# Patient Record
Sex: FEMALE | Race: WHITE | Employment: OTHER | URBAN - NONMETROPOLITAN AREA
[De-identification: names, ages, dates, MRNs, and addresses within clinical notes are randomized per-mention and may not be internally consistent; named-entity substitution may affect disease eponyms.]

---

## 2017-05-03 ENCOUNTER — TELEPHONE (OUTPATIENT)
Dept: FAMILY MEDICINE CLINIC | Age: 82
End: 2017-05-03

## 2017-07-18 ENCOUNTER — TELEPHONE (OUTPATIENT)
Dept: FAMILY MEDICINE CLINIC | Age: 82
End: 2017-07-18

## 2017-07-18 DIAGNOSIS — F41.9 ANXIETY: ICD-10-CM

## 2017-07-18 DIAGNOSIS — R63.4 WEIGHT LOSS: ICD-10-CM

## 2017-07-18 DIAGNOSIS — R53.1 GENERALIZED WEAKNESS: ICD-10-CM

## 2017-07-18 PROBLEM — F03.90 DEMENTIA (HCC): Status: ACTIVE | Noted: 2017-07-18

## 2017-07-18 PROBLEM — G47.00 INSOMNIA: Status: ACTIVE | Noted: 2017-07-18

## 2017-07-18 RX ORDER — KETOCONAZOLE 20 MG/ML
SHAMPOO TOPICAL
COMMUNITY

## 2017-07-18 RX ORDER — ACETAMINOPHEN 325 MG/1
650 TABLET ORAL 3 TIMES DAILY
COMMUNITY

## 2017-07-18 RX ORDER — ARIPIPRAZOLE 5 MG/1
5 TABLET ORAL DAILY
COMMUNITY
End: 2017-07-27 | Stop reason: DRUGHIGH

## 2017-07-18 RX ORDER — PHENOL 1.4 %
1 AEROSOL, SPRAY (ML) MUCOUS MEMBRANE NIGHTLY
COMMUNITY

## 2017-07-27 RX ORDER — ARIPIPRAZOLE 2 MG/1
2 TABLET ORAL 2 TIMES DAILY
COMMUNITY

## 2017-08-24 ENCOUNTER — OUTSIDE SERVICES (OUTPATIENT)
Dept: INTERNAL MEDICINE | Age: 82
End: 2017-08-24
Payer: MEDICARE

## 2017-08-24 DIAGNOSIS — R63.4 WEIGHT LOSS: ICD-10-CM

## 2017-08-24 DIAGNOSIS — F41.9 ANXIETY: ICD-10-CM

## 2017-08-24 DIAGNOSIS — G47.00 INSOMNIA, UNSPECIFIED TYPE: ICD-10-CM

## 2017-08-24 DIAGNOSIS — F03.90 DEMENTIA WITHOUT BEHAVIORAL DISTURBANCE, UNSPECIFIED DEMENTIA TYPE: Primary | ICD-10-CM

## 2017-08-24 DIAGNOSIS — R53.1 GENERALIZED WEAKNESS: ICD-10-CM

## 2017-08-24 PROCEDURE — 99308 SBSQ NF CARE LOW MDM 20: CPT | Performed by: NURSE PRACTITIONER

## 2017-08-29 DIAGNOSIS — R63.4 WEIGHT LOSS: ICD-10-CM

## 2017-09-26 ENCOUNTER — OUTSIDE SERVICES (OUTPATIENT)
Dept: FAMILY MEDICINE CLINIC | Age: 82
End: 2017-09-26
Payer: MEDICARE

## 2017-09-26 DIAGNOSIS — F02.80 LATE ONSET ALZHEIMER'S DISEASE WITHOUT BEHAVIORAL DISTURBANCE (HCC): Primary | ICD-10-CM

## 2017-09-26 DIAGNOSIS — G30.1 LATE ONSET ALZHEIMER'S DISEASE WITHOUT BEHAVIORAL DISTURBANCE (HCC): Primary | ICD-10-CM

## 2017-09-26 DIAGNOSIS — R53.1 GENERALIZED WEAKNESS: ICD-10-CM

## 2017-09-26 DIAGNOSIS — G47.00 INSOMNIA, UNSPECIFIED TYPE: ICD-10-CM

## 2017-09-26 DIAGNOSIS — F41.9 ANXIETY: ICD-10-CM

## 2017-09-26 DIAGNOSIS — R63.4 WEIGHT LOSS: ICD-10-CM

## 2017-09-26 PROCEDURE — 99308 SBSQ NF CARE LOW MDM 20: CPT | Performed by: FAMILY MEDICINE

## 2017-10-24 PROCEDURE — 99308 SBSQ NF CARE LOW MDM 20: CPT | Performed by: NURSE PRACTITIONER

## 2017-10-28 ENCOUNTER — OUTSIDE SERVICES (OUTPATIENT)
Dept: INTERNAL MEDICINE | Age: 82
End: 2017-10-28
Payer: MEDICARE

## 2017-10-28 DIAGNOSIS — F03.90 DEMENTIA WITHOUT BEHAVIORAL DISTURBANCE, UNSPECIFIED DEMENTIA TYPE: Primary | ICD-10-CM

## 2017-10-28 DIAGNOSIS — R63.4 WEIGHT LOSS: ICD-10-CM

## 2017-10-28 DIAGNOSIS — R53.1 GENERALIZED WEAKNESS: ICD-10-CM

## 2017-10-28 DIAGNOSIS — F41.9 ANXIETY: ICD-10-CM

## 2017-10-28 DIAGNOSIS — G47.00 INSOMNIA, UNSPECIFIED TYPE: ICD-10-CM

## 2017-10-28 NOTE — PROGRESS NOTES
[facility]  Date of Service: 10/24/17  Mark Anthony Mcclure  Date of Birth 1935  Code Status----FULL    CHIEF COMPLAINT: Follow up for routine visit and review of chronic health conditions    HPI:  Overall Sully Ayon has been doing well over the last month. She denies any pain. No shortness of breath. Dementia has been stable. No behavioral outbursts. Continues on Abilify. Weakness and anxiety have been stable. History of constipation which has been stable. Continue the melatonin for insomnia. Sleeping well. Nursing staff denies any nursing service issues. Labs:   None on file will update    Radiology    Immunization History   Administered Date(s) Administered    Influenza, High Dose 10/10/2016, 10/05/2017    Pneumococcal 13-valent Conjugate ([id number]) 10/15/2015    Pneumococcal Polysaccharide ([id number]) 06/08/2010       Allergies   Allergen Reactions    Aricept [Donepezil Hcl]     Exelon [Rivastigmine Tartrate]     Namenda [Memantine Hcl]        No past medical history on file. Past Surgical History:   Procedure Laterality Date    HAND SURGERY Right     HYSTERECTOMY, TOTAL ABDOMINAL  1984       Social History     Social History    Marital status:      Spouse name: N/A    Number of children: N/A    Years of education: N/A     Occupational History    Not on file.      Social History Main Topics    Smoking status: Not on file    Smokeless tobacco: Not on file    Alcohol use Not on file    Drug use: Unknown    Sexual activity: Not on file     Other Topics Concern    Not on file     Social History Narrative    No narrative on file       Current Outpatient Prescriptions on File Prior to Visit   Medication Sig Dispense Refill    Wheat Dextrin (BENEFIBER PO) Take by mouth 2 times daily      ARIPiprazole (ABILIFY) 2 MG tablet Take 2 mg by mouth nightly      NATURAL PSYLLIUM FIBER PO Take 525 mg by mouth daily      Melatonin 10 MG TABS Take 1 tablet by mouth nightly      ketoconazole (NIZORAL) 2 % shampoo Apply topically Twice a Week Apply topically daily as needed.  bisacodyl (DULCOLAX) 5 MG EC tablet Take 5 mg by mouth as needed for Constipation      magnesium hydroxide (MILK OF MAGNESIA) 400 MG/5ML suspension Take 30 mLs by mouth as needed for Constipation      acetaminophen (TYLENOL) 325 MG tablet Take 650 mg by mouth as needed for Pain      carbamide peroxide (DEBROX) 6.5 % otic solution as needed       No current facility-administered medications on file prior to visit. Review of Systems   Unable to perform ROS: Dementia       Physical Exam   Constitutional: She is well-developed, well-nourished, and in no distress. No distress. HENT:   Head: Normocephalic and atraumatic. Right Ear: External ear normal.   Left Ear: External ear normal.   Eyes: Right eye exhibits no discharge. Left eye exhibits no discharge. Neck: Neck supple. No tracheal deviation present. Cardiovascular: Normal rate, regular rhythm and normal heart sounds. Exam reveals no gallop and no friction rub. No murmur heard. Pulmonary/Chest: Effort normal and breath sounds normal. No respiratory distress. She has no wheezes. She has no rales. She exhibits no tenderness. Abdominal: Soft. Bowel sounds are normal. She exhibits no distension. There is no tenderness. Musculoskeletal: She exhibits no edema. Neurological: She is alert. No cranial nerve deficit. Skin: Skin is warm and dry. No rash noted. She is not diaphoretic. Psychiatric: Mood and affect normal.   Nursing note and vitals reviewed. Vital signs: Temperature: 98°F, blood pressure 110/66, pulse 62, respirations 16, SPO2 96% on room air, weight 102.8 pounds    ASSESSMENT:  1. Admission along with her  due to advanced dementia with hallucinations in the past.-doing well. Continues on Abilify with no behavioral concerns. 2. Generalized weakness- stable  3. Anxiety- stable   4.  History of constipation. - no acute concerns at present  5. Seborrheic dermatitis. - quiescent   6. Weight loss. Currently stable. Will continue offering supplements. Consider Remeron versus other stimulants if needed in the future, likely tied in with her dementia. She has no physical complaints. 7. Insomnia- stable on Melatonin    PLAN:   No labs on office chart or nursing home chart. Will update labs. Remainder of chronic health conditions stable and unchanged  Plan as noted above- will follow up for routine monthly rounds. They will call sooner with any concerns prior.      Electronically signed by Diego Shelton CNP on 10/28/2017 at 11:06 AM

## 2017-11-21 ENCOUNTER — OUTSIDE SERVICES (OUTPATIENT)
Dept: FAMILY MEDICINE CLINIC | Age: 82
End: 2017-11-21
Payer: MEDICARE

## 2017-11-21 DIAGNOSIS — F02.80 LATE ONSET ALZHEIMER'S DISEASE WITHOUT BEHAVIORAL DISTURBANCE (HCC): Primary | ICD-10-CM

## 2017-11-21 DIAGNOSIS — G30.1 LATE ONSET ALZHEIMER'S DISEASE WITHOUT BEHAVIORAL DISTURBANCE (HCC): Primary | ICD-10-CM

## 2017-11-21 DIAGNOSIS — G47.00 INSOMNIA, UNSPECIFIED TYPE: ICD-10-CM

## 2017-11-21 DIAGNOSIS — R63.4 WEIGHT LOSS: ICD-10-CM

## 2017-11-21 DIAGNOSIS — R53.1 GENERALIZED WEAKNESS: ICD-10-CM

## 2017-11-21 DIAGNOSIS — F41.9 ANXIETY: ICD-10-CM

## 2017-11-21 PROCEDURE — 99308 SBSQ NF CARE LOW MDM 20: CPT | Performed by: FAMILY MEDICINE

## 2017-12-28 ENCOUNTER — OUTSIDE SERVICES (OUTPATIENT)
Dept: INTERNAL MEDICINE | Age: 82
End: 2017-12-28
Payer: MEDICARE

## 2017-12-28 DIAGNOSIS — G47.00 INSOMNIA, UNSPECIFIED TYPE: ICD-10-CM

## 2017-12-28 DIAGNOSIS — G30.1 LATE ONSET ALZHEIMER'S DISEASE WITHOUT BEHAVIORAL DISTURBANCE (HCC): Primary | ICD-10-CM

## 2017-12-28 DIAGNOSIS — F41.9 ANXIETY: ICD-10-CM

## 2017-12-28 DIAGNOSIS — R53.1 GENERALIZED WEAKNESS: ICD-10-CM

## 2017-12-28 DIAGNOSIS — R63.4 WEIGHT LOSS: ICD-10-CM

## 2017-12-28 DIAGNOSIS — F02.80 LATE ONSET ALZHEIMER'S DISEASE WITHOUT BEHAVIORAL DISTURBANCE (HCC): Primary | ICD-10-CM

## 2017-12-29 PROCEDURE — 99307 SBSQ NF CARE SF MDM 10: CPT | Performed by: NURSE PRACTITIONER

## 2017-12-29 NOTE — PROGRESS NOTES
Visit   Medication Sig Dispense Refill    Wheat Dextrin (BENEFIBER PO) Take by mouth 2 times daily      ARIPiprazole (ABILIFY) 2 MG tablet Take 2 mg by mouth nightly      NATURAL PSYLLIUM FIBER PO Take 525 mg by mouth daily      Melatonin 10 MG TABS Take 1 tablet by mouth nightly      ketoconazole (NIZORAL) 2 % shampoo Apply topically Twice a Week Apply topically daily as needed.  bisacodyl (DULCOLAX) 5 MG EC tablet Take 5 mg by mouth as needed for Constipation      magnesium hydroxide (MILK OF MAGNESIA) 400 MG/5ML suspension Take 30 mLs by mouth as needed for Constipation      acetaminophen (TYLENOL) 325 MG tablet Take 650 mg by mouth as needed for Pain      carbamide peroxide (DEBROX) 6.5 % otic solution as needed       No current facility-administered medications on file prior to visit. Review of Systems   Unable to perform ROS: Dementia       Physical Exam   Constitutional: She is well-developed, well-nourished, and in no distress. No distress. HENT:   Head: Normocephalic and atraumatic. Right Ear: External ear normal.   Left Ear: External ear normal.   Eyes: Right eye exhibits no discharge. Left eye exhibits no discharge. Neck: Neck supple. No tracheal deviation present. Cardiovascular: Normal rate and regular rhythm. Pulmonary/Chest: Effort normal and breath sounds normal. No respiratory distress. She has no wheezes. She has no rales. Abdominal: Soft. Bowel sounds are normal. She exhibits no distension. There is no tenderness. There is no rebound. Musculoskeletal: She exhibits no edema. Neurological: She is alert. No cranial nerve deficit. Skin: Skin is warm and dry. No rash noted. She is not diaphoretic. Psychiatric: Mood and affect normal.   Nursing note and vitals reviewed. Vital signs: Temperature: 98.1°F, blood pressure 105/667, pulse 64, respirations 16, SPO2 97% on room air, weight 105.2 pounds     ASSESSMENT/PLAN:  1.  Dementia- Admission along with her

## 2018-01-23 ENCOUNTER — OUTSIDE SERVICES (OUTPATIENT)
Dept: FAMILY MEDICINE CLINIC | Age: 83
End: 2018-01-23
Payer: MEDICARE

## 2018-01-23 DIAGNOSIS — F02.80 LATE ONSET ALZHEIMER'S DISEASE WITHOUT BEHAVIORAL DISTURBANCE (HCC): Primary | ICD-10-CM

## 2018-01-23 DIAGNOSIS — R63.4 WEIGHT LOSS: ICD-10-CM

## 2018-01-23 DIAGNOSIS — G30.1 LATE ONSET ALZHEIMER'S DISEASE WITHOUT BEHAVIORAL DISTURBANCE (HCC): Primary | ICD-10-CM

## 2018-01-23 DIAGNOSIS — R53.1 GENERALIZED WEAKNESS: ICD-10-CM

## 2018-01-23 DIAGNOSIS — G47.00 INSOMNIA, UNSPECIFIED TYPE: ICD-10-CM

## 2018-01-23 DIAGNOSIS — F41.9 ANXIETY: ICD-10-CM

## 2018-01-23 PROCEDURE — 99308 SBSQ NF CARE LOW MDM 20: CPT | Performed by: FAMILY MEDICINE

## 2018-01-23 NOTE — PROGRESS NOTES
monotone and soft. off subject at times. Pleasantly confused.        ASSESSMENT/PLAN:   Chronic Active Problems   1. Admission along with her  due to advanced dementia with hallucinations in the past.-doing well. ( in assisted living now) Last month Abilify decreased to 2 mg and pt tolerating well- no behavioral concerns. Will check with behavioral management over the interval to discuss further titration. Family prefers status quo.    2. Generalized weakness- stable/improved  3. Anxiety- stable   4. History of constipation. - no acute concerns at present  5. Seborrheic dermatitis. - quiescent   6. Weight loss. Currently stable to up 2 lbs. Will continue offering supplements. Consider Remeron versus other stimulants if needed in the future, likely tied in with her dementia. She has no physical complaints  7.  Insomnia- stable on Melatonin    As noted under chronic active problem list  Continue with current treatment/medication regime  Follow up with monthly rounds  Call sooner with concerns / condition change      Electronically signed by Natalie Vergara MD on 1/23/2018 at 3:19 PM              Additional Documentation   Encounter Info:     Billing Info,     Allergies,     Detailed Report,     History,     Medications          Visit Diagnoses        Dementia without behavioral disturbance, unspecified dementia type       Anxiety       Generalized weakness       Weight loss       Insomnia, unspecified type

## 2018-02-27 PROCEDURE — 99307 SBSQ NF CARE SF MDM 10: CPT | Performed by: NURSE PRACTITIONER

## 2018-03-02 ENCOUNTER — OUTSIDE SERVICES (OUTPATIENT)
Dept: PRIMARY CARE CLINIC | Age: 83
End: 2018-03-02
Payer: MEDICARE

## 2018-03-02 DIAGNOSIS — R63.4 WEIGHT LOSS: ICD-10-CM

## 2018-03-02 DIAGNOSIS — R53.1 GENERALIZED WEAKNESS: ICD-10-CM

## 2018-03-02 DIAGNOSIS — F02.80 LATE ONSET ALZHEIMER'S DISEASE WITHOUT BEHAVIORAL DISTURBANCE (HCC): Primary | ICD-10-CM

## 2018-03-02 DIAGNOSIS — F41.9 ANXIETY: ICD-10-CM

## 2018-03-02 DIAGNOSIS — G30.1 LATE ONSET ALZHEIMER'S DISEASE WITHOUT BEHAVIORAL DISTURBANCE (HCC): Primary | ICD-10-CM

## 2018-03-02 DIAGNOSIS — G47.00 INSOMNIA, UNSPECIFIED TYPE: ICD-10-CM

## 2018-03-02 PROBLEM — F03.90 DEMENTIA (HCC): Status: RESOLVED | Noted: 2017-07-18 | Resolved: 2018-03-02

## 2018-03-27 ENCOUNTER — OUTSIDE SERVICES (OUTPATIENT)
Dept: FAMILY MEDICINE CLINIC | Age: 83
End: 2018-03-27
Payer: MEDICARE

## 2018-03-27 DIAGNOSIS — L21.9 SEBORRHEIC DERMATITIS: ICD-10-CM

## 2018-03-27 DIAGNOSIS — G47.00 INSOMNIA, UNSPECIFIED TYPE: ICD-10-CM

## 2018-03-27 DIAGNOSIS — G30.1 LATE ONSET ALZHEIMER'S DISEASE WITHOUT BEHAVIORAL DISTURBANCE (HCC): Primary | ICD-10-CM

## 2018-03-27 DIAGNOSIS — F02.80 LATE ONSET ALZHEIMER'S DISEASE WITHOUT BEHAVIORAL DISTURBANCE (HCC): Primary | ICD-10-CM

## 2018-03-27 DIAGNOSIS — F41.9 ANXIETY: ICD-10-CM

## 2018-03-27 DIAGNOSIS — K59.00 CONSTIPATION, UNSPECIFIED CONSTIPATION TYPE: ICD-10-CM

## 2018-03-27 DIAGNOSIS — R63.4 WEIGHT LOSS: ICD-10-CM

## 2018-03-27 DIAGNOSIS — R53.1 GENERALIZED WEAKNESS: ICD-10-CM

## 2018-03-27 PROCEDURE — 99308 SBSQ NF CARE LOW MDM 20: CPT | Performed by: FAMILY MEDICINE

## 2018-04-24 ENCOUNTER — OUTSIDE SERVICES (OUTPATIENT)
Dept: INTERNAL MEDICINE | Age: 83
End: 2018-04-24
Payer: MEDICARE

## 2018-04-24 DIAGNOSIS — K59.00 CONSTIPATION, UNSPECIFIED CONSTIPATION TYPE: ICD-10-CM

## 2018-04-24 DIAGNOSIS — F02.80 LATE ONSET ALZHEIMER'S DISEASE WITHOUT BEHAVIORAL DISTURBANCE (HCC): Primary | ICD-10-CM

## 2018-04-24 DIAGNOSIS — G47.00 INSOMNIA, UNSPECIFIED TYPE: ICD-10-CM

## 2018-04-24 DIAGNOSIS — G30.1 LATE ONSET ALZHEIMER'S DISEASE WITHOUT BEHAVIORAL DISTURBANCE (HCC): Primary | ICD-10-CM

## 2018-04-24 DIAGNOSIS — R63.4 WEIGHT LOSS: ICD-10-CM

## 2018-04-24 DIAGNOSIS — F41.9 ANXIETY: ICD-10-CM

## 2018-04-24 PROCEDURE — 99307 SBSQ NF CARE SF MDM 10: CPT | Performed by: NURSE PRACTITIONER

## 2018-05-29 ENCOUNTER — OUTSIDE SERVICES (OUTPATIENT)
Dept: FAMILY MEDICINE CLINIC | Age: 83
End: 2018-05-29
Payer: MEDICARE

## 2018-05-29 DIAGNOSIS — F02.80 LATE ONSET ALZHEIMER'S DISEASE WITHOUT BEHAVIORAL DISTURBANCE (HCC): Primary | ICD-10-CM

## 2018-05-29 DIAGNOSIS — F41.9 ANXIETY: ICD-10-CM

## 2018-05-29 DIAGNOSIS — R53.1 GENERALIZED WEAKNESS: ICD-10-CM

## 2018-05-29 DIAGNOSIS — G47.00 INSOMNIA, UNSPECIFIED TYPE: ICD-10-CM

## 2018-05-29 DIAGNOSIS — G30.1 LATE ONSET ALZHEIMER'S DISEASE WITHOUT BEHAVIORAL DISTURBANCE (HCC): Primary | ICD-10-CM

## 2018-05-29 DIAGNOSIS — L21.9 SEBORRHEIC DERMATITIS: ICD-10-CM

## 2018-05-29 DIAGNOSIS — R63.4 WEIGHT LOSS: ICD-10-CM

## 2018-05-29 PROCEDURE — 99309 SBSQ NF CARE MODERATE MDM 30: CPT | Performed by: FAMILY MEDICINE

## 2018-06-05 RX ORDER — BISACODYL 10 MG
10 SUPPOSITORY, RECTAL RECTAL DAILY PRN
COMMUNITY

## 2018-06-28 ENCOUNTER — OUTSIDE SERVICES (OUTPATIENT)
Dept: INTERNAL MEDICINE | Age: 83
End: 2018-06-28
Payer: MEDICARE

## 2018-06-28 DIAGNOSIS — F41.9 ANXIETY: ICD-10-CM

## 2018-06-28 DIAGNOSIS — R53.1 GENERALIZED WEAKNESS: ICD-10-CM

## 2018-06-28 DIAGNOSIS — G30.1 LATE ONSET ALZHEIMER'S DISEASE WITHOUT BEHAVIORAL DISTURBANCE (HCC): Primary | ICD-10-CM

## 2018-06-28 DIAGNOSIS — K59.00 CONSTIPATION, UNSPECIFIED CONSTIPATION TYPE: ICD-10-CM

## 2018-06-28 DIAGNOSIS — F02.80 LATE ONSET ALZHEIMER'S DISEASE WITHOUT BEHAVIORAL DISTURBANCE (HCC): Primary | ICD-10-CM

## 2018-07-01 PROCEDURE — 99307 SBSQ NF CARE SF MDM 10: CPT | Performed by: NURSE PRACTITIONER

## 2018-07-01 NOTE — PROGRESS NOTES
living now) Previosuly Abilify decreased to 2 mg. attempted to further decreased however patient did not tolerate and therefore back up to 2 mg daily - and pt tolerating well- no behavioral concerns.  We'll continue on the same. 2. Generalized weakness. Stable/improved  3. Anxiety. Stable   4. History of constipation. No acute concerns at present  5. Seborrheic dermatitis. Quiescent   6. Weight loss. Currently stable. Will continue offering supplements.  Weight stable at present.  Possible addition of Remeron if needed.       7. Insomnia. Stable on Melatonin    Remainder of chronic health conditions stable and unchanged  Plan as noted above- will follow up for routine monthly rounds. They will call sooner with any concerns prior.      Electronically signed by Daryle Ash, APRN - CNP on 7/1/2018 at 2:21 PM

## 2018-07-24 ENCOUNTER — OUTSIDE SERVICES (OUTPATIENT)
Dept: FAMILY MEDICINE CLINIC | Age: 83
End: 2018-07-24
Payer: MEDICARE

## 2018-07-24 DIAGNOSIS — G47.00 INSOMNIA, UNSPECIFIED TYPE: ICD-10-CM

## 2018-07-24 DIAGNOSIS — K59.00 CONSTIPATION, UNSPECIFIED CONSTIPATION TYPE: ICD-10-CM

## 2018-07-24 DIAGNOSIS — F02.80 LATE ONSET ALZHEIMER'S DISEASE WITHOUT BEHAVIORAL DISTURBANCE (HCC): Primary | ICD-10-CM

## 2018-07-24 DIAGNOSIS — R63.4 WEIGHT LOSS: ICD-10-CM

## 2018-07-24 DIAGNOSIS — R53.1 GENERALIZED WEAKNESS: ICD-10-CM

## 2018-07-24 DIAGNOSIS — G30.1 LATE ONSET ALZHEIMER'S DISEASE WITHOUT BEHAVIORAL DISTURBANCE (HCC): Primary | ICD-10-CM

## 2018-07-24 DIAGNOSIS — L21.9 SEBORRHEIC DERMATITIS: ICD-10-CM

## 2018-07-24 DIAGNOSIS — F41.9 ANXIETY: ICD-10-CM

## 2018-07-31 NOTE — PROGRESS NOTES
[facility]  Date of Service:  7/24/2018  Danika Turk  Date of Birth 1935  MRN: [id number]  Code Status----FULL     CHIEF COMPLAINT: Follow up for routine visit and review of chronic health conditions.     HPI:  Cornell Bills has been doing well over the interval period. She continues to ambulate unassisted. She has a tendency to wander more and is generally doing so safely with no current concerns for falls. Nursing staff denies any difficulties with bowel, bladder or meals. She is pleasantly confused with no significant behavioral disturbances.     LABS:   November 11, 2017  WBC 5.7  Hemoglobin 12.5  Hematocrit 36.8  Platelets 576     Glucose 67  Sodium 140  Potassium 4.5  BUN 14  Creatinine 0.6  Calcium 9.1  Albumin 3.5  Alkaline phosphorus 98  AST 15  ALT     Lipid panel  Total cholesterol 225  Triglycerides 109  HDL 73    Hemoglobin A1c 5.5    Immunization History   Administered Date(s) Administered    Influenza, High Dose 10/10/2016, 10/05/2017    Pneumococcal 13-valent Conjugate ([id number]) 10/15/2015    Pneumococcal Polysaccharide ([id number]) 06/08/2010       Allergies   Allergen Reactions    Aricept [Donepezil Hcl]     Exelon [Rivastigmine Tartrate]     Namenda [Memantine Hcl]        No past medical history on file. Past Surgical History:   Procedure Laterality Date    HAND SURGERY Right     HYSTERECTOMY, TOTAL ABDOMINAL  1984       Social History     Social History    Marital status:      Spouse name: N/A    Number of children: N/A    Years of education: N/A     Occupational History    Not on file.      Social History Main Topics    Smoking status: Not on file    Smokeless tobacco: Not on file    Alcohol use Not on file    Drug use: Unknown    Sexual activity: Not on file     Other Topics Concern    Not on file     Social History Narrative    No narrative on file       Current Outpatient Prescriptions   Medication Sig Dispense Refill     Stable/improved  3. Anxiety. Stable   4. History of constipation. No acute concerns at present  5. Seborrheic dermatitis. Quiescent   6. Weight loss. Currently stable. Will continue offering supplements.  Weight stable at present.  Possible addition of Remeron if needed.       7. Insomnia. Stable on Melatonin    Remainder of chronic health conditions stable and unchanged  Plan as noted above. Will follow up for routine monthly rounds. They will call sooner with any concerns prior.    Electronically signed by Yamilka Latham MD on 8/30/2018 at 9:09 AM      Judit CLEMENT am scribing for Yamilka Latham MD 7/31/2018 at 9:38 AM.

## 2018-08-01 PROCEDURE — 99309 SBSQ NF CARE MODERATE MDM 30: CPT | Performed by: FAMILY MEDICINE

## 2018-08-28 ENCOUNTER — OUTSIDE SERVICES (OUTPATIENT)
Dept: INTERNAL MEDICINE | Age: 83
End: 2018-08-28
Payer: MEDICARE

## 2018-08-28 DIAGNOSIS — G30.1 LATE ONSET ALZHEIMER'S DISEASE WITHOUT BEHAVIORAL DISTURBANCE (HCC): Primary | ICD-10-CM

## 2018-08-28 DIAGNOSIS — G47.00 INSOMNIA, UNSPECIFIED TYPE: ICD-10-CM

## 2018-08-28 DIAGNOSIS — F41.9 ANXIETY: ICD-10-CM

## 2018-08-28 DIAGNOSIS — R63.4 WEIGHT LOSS: ICD-10-CM

## 2018-08-28 DIAGNOSIS — F02.80 LATE ONSET ALZHEIMER'S DISEASE WITHOUT BEHAVIORAL DISTURBANCE (HCC): Primary | ICD-10-CM

## 2018-08-28 PROCEDURE — 99307 SBSQ NF CARE SF MDM 10: CPT | Performed by: NURSE PRACTITIONER

## 2018-08-29 NOTE — PROGRESS NOTES
[facility]  Date of Service: 08/28/18  Josephine [place]  Date of Birth 1935  Code Status----FULL     CHIEF COMPLAINT: Follow up for routine visit and review of chronic health conditions.     HPI:  Jennifer Osorio has been doing well over the last month. No behavioral disturbances with her dementia. She continues to have some generalized weakness but is able to walk safely with staff. Requires staff for assistance with ADLs. Continues to eat and drink without difficulty. Does require cueing from the nursing staff for oral intake. Weight has remained stable. No problems with bowel or bladder. Nursing staff denies any nursing service issues. LABS:   November 11, 2017  WBC 5.7  Hemoglobin 12.5  Hematocrit 36.8  Platelets 975     Glucose 67  Sodium 140  Potassium 4.5  BUN 14  Creatinine 0.6  Calcium 9.1  Albumin 3.5  Alkaline phosphorus 98  AST 15  ALT     Lipid panel  Total cholesterol 225  Triglycerides 109  HDL 73    Hemoglobin A1c 5.5        Immunization History   Administered Date(s) Administered    Influenza, High Dose 10/10/2016, 10/05/2017    Pneumococcal 13-valent Conjugate (Owktcer38) 10/15/2015    Pneumococcal Polysaccharide (Ywstggpty25) 06/08/2010       Allergies   Allergen Reactions    Aricept [Donepezil Hcl]     Exelon [Rivastigmine Tartrate]     Namenda [Memantine Hcl]        No past medical history on file. Past Surgical History:   Procedure Laterality Date    HAND SURGERY Right     HYSTERECTOMY, TOTAL ABDOMINAL  1984       Social History     Social History    Marital status:      Spouse name: N/A    Number of children: N/A    Years of education: N/A     Occupational History    Not on file.      Social History Main Topics    Smoking status: Not on file    Smokeless tobacco: Not on file    Alcohol use Not on file    Drug use: Unknown    Sexual activity: Not on file     Other Topics Concern    Not on file     Social History Narrative    No narrative on file       Current Outpatient Prescriptions on File Prior to Visit   Medication Sig Dispense Refill    bisacodyl (DULCOLAX) 10 MG suppository Place 10 mg rectally daily as needed for Constipation (Constipation)       Wheat Dextrin (BENEFIBER PO) Take by mouth 2 times daily      ARIPiprazole (ABILIFY) 2 MG tablet Take 2 mg by mouth nightly      Melatonin 10 MG TABS Take 1 tablet by mouth nightly      ketoconazole (NIZORAL) 2 % shampoo Apply topically Twice a Week Apply topically daily as needed.  magnesium hydroxide (MILK OF MAGNESIA) 400 MG/5ML suspension Take 30 mLs by mouth as needed for Constipation      acetaminophen (TYLENOL) 325 MG tablet Take 650 mg by mouth as needed for Pain      carbamide peroxide (DEBROX) 6.5 % otic solution as needed       No current facility-administered medications on file prior to visit. Review of Systems   Unable to perform ROS: Dementia       Physical Exam   Constitutional: She is well-developed, well-nourished, and in no distress. No distress. HENT:   Head: Normocephalic and atraumatic. Right Ear: External ear normal.   Left Ear: External ear normal.   Eyes: Right eye exhibits no discharge. Left eye exhibits no discharge. Neck: Neck supple. No tracheal deviation present. Cardiovascular: Normal rate, regular rhythm and normal heart sounds. Exam reveals no gallop and no friction rub. No murmur heard. Pulmonary/Chest: Effort normal and breath sounds normal. No respiratory distress. She has no wheezes. She has no rales. Abdominal: Soft. Bowel sounds are normal. She exhibits no distension. Musculoskeletal: She exhibits no edema. Neurological: She is alert. No cranial nerve deficit. Coordination (Shuffled gait) abnormal.   Alert to name only   Skin: Skin is warm and dry. No rash noted. She is not diaphoretic. No pallor. Psychiatric: Mood and affect normal.   Nursing note and vitals reviewed.       Vital signs: Temperature: 97.6°F, blood pressure 113/60, pulse 70, respirations 18, SPO2 95% on room air, weight 111.8 pounds    ASSESSMENT/PLAN:  1. Dementia.  Admitted with  due to advanced dementia with hallucinations in the past.-doing well. ( in assisted living now) Previosuly Abilify decreased to 2 mg. attempted to further decreased however patient did not tolerate and therefore back up to 2 mg daily - and pt tolerating well- no behavioral concerns.  We'll continue on the same. 2. Generalized weakness.  Stable/improved  3. Anxiety. Stable   4. History of constipation. No acute concerns at present  5. Seborrheic dermatitis. Quiescent   6. Weight loss. Currently stable. Will continue offering supplements.  Weight stable at present.  Possible addition of Remeron if needed.       7. Insomnia. Stable on Melatonin       Remainder of chronic health conditions stable and unchanged  Plan as noted above- will follow up for routine monthly rounds. They will call sooner with any concerns prior.      Electronically signed by OLEG Mathur CNP on 8/28/2018 at 9:18 PM

## 2018-09-25 ENCOUNTER — OUTSIDE SERVICES (OUTPATIENT)
Dept: FAMILY MEDICINE CLINIC | Age: 83
End: 2018-09-25
Payer: MEDICARE

## 2018-09-25 DIAGNOSIS — G47.00 INSOMNIA, UNSPECIFIED TYPE: ICD-10-CM

## 2018-09-25 DIAGNOSIS — R53.1 GENERALIZED WEAKNESS: ICD-10-CM

## 2018-09-25 DIAGNOSIS — L21.9 SEBORRHEIC DERMATITIS: ICD-10-CM

## 2018-09-25 DIAGNOSIS — G30.1 LATE ONSET ALZHEIMER'S DISEASE WITHOUT BEHAVIORAL DISTURBANCE (HCC): Primary | ICD-10-CM

## 2018-09-25 DIAGNOSIS — K59.00 CONSTIPATION, UNSPECIFIED CONSTIPATION TYPE: ICD-10-CM

## 2018-09-25 DIAGNOSIS — F02.80 LATE ONSET ALZHEIMER'S DISEASE WITHOUT BEHAVIORAL DISTURBANCE (HCC): Primary | ICD-10-CM

## 2018-09-25 DIAGNOSIS — F41.9 ANXIETY: ICD-10-CM

## 2018-09-25 DIAGNOSIS — R63.4 WEIGHT LOSS: ICD-10-CM

## 2018-10-03 NOTE — PROGRESS NOTES
moderate-to-severe with history of hallucinations in the past.  She has done generally well to this point on Abilify. With attempts to decrease the dose leading to recurrent behaviors and agitation we have gone up and down on the dose several times and suspect she is on the minimum effective dose at present. She is having some progressing anxiety and restlessness. We are going to try to add Zoloft at 50 mg a day over the next interval to see if we can bring down some of her anxiety and restless behaviors. 2. Generalized weakness.  Stable/improved  3. Anxiety. Stable   4. History of constipation. No acute concerns at present  5. Seborrheic dermatitis. Quiescent   6. Weight loss. Currently she is improved, stable with about a 4-pound weight gain over the interval period. Will continue offering supplements. Consider addition of Remeron if needed in the future.       7. Insomnia. Stable on melatonin    Remainder of chronic health conditions stable and unchanged  Plan as noted above. Will follow up for routine monthly rounds. They will call sooner with any concerns prior.        Stella Bonner am scribing for Yamilka Keller MD 10/3/2018 at 5:09 PM.

## 2018-10-04 PROCEDURE — 99309 SBSQ NF CARE MODERATE MDM 30: CPT | Performed by: FAMILY MEDICINE

## 2018-10-08 RX ORDER — SERTRALINE HYDROCHLORIDE 25 MG/1
25 TABLET, FILM COATED ORAL DAILY
COMMUNITY
End: 2018-10-30 | Stop reason: DRUGHIGH

## 2018-10-30 ENCOUNTER — OUTSIDE SERVICES (OUTPATIENT)
Dept: INTERNAL MEDICINE | Age: 83
End: 2018-10-30
Payer: MEDICARE

## 2018-10-30 DIAGNOSIS — R63.4 WEIGHT LOSS: ICD-10-CM

## 2018-10-30 DIAGNOSIS — K59.00 CONSTIPATION, UNSPECIFIED CONSTIPATION TYPE: ICD-10-CM

## 2018-10-30 DIAGNOSIS — F02.80 LATE ONSET ALZHEIMER'S DISEASE WITHOUT BEHAVIORAL DISTURBANCE (HCC): Primary | ICD-10-CM

## 2018-10-30 DIAGNOSIS — F41.9 ANXIETY: ICD-10-CM

## 2018-10-30 DIAGNOSIS — G47.00 INSOMNIA, UNSPECIFIED TYPE: ICD-10-CM

## 2018-10-30 DIAGNOSIS — G30.1 LATE ONSET ALZHEIMER'S DISEASE WITHOUT BEHAVIORAL DISTURBANCE (HCC): Primary | ICD-10-CM

## 2018-10-30 PROCEDURE — 99308 SBSQ NF CARE LOW MDM 20: CPT | Performed by: NURSE PRACTITIONER

## 2018-10-31 NOTE — PROGRESS NOTES
[facility]  Date of Service: 10/30/18  Shary Shone  Date of Birth 1935  Code Status----FULL     CHIEF COMPLAINT: Followup for routine visit and review of chronic health conditions.     HPI:  It has improved. So left was added last month. States he continues to have occasional episodes of anxiety but improving. Continues to eat and drink with the assistance of staff. Nursing staff deny any problems with bowel or bladder. Nursing staff deny any nursing service issues.     LABS:   November 11, 2017  WBC 5.7  Hemoglobin 12.5  Hematocrit 36.8  Platelets 245     Glucose 67  Sodium 140  Potassium 4.5  BUN 14  Creatinine 0.6  Calcium 9.1  Albumin 3.5  Alkaline phosphorus 98  AST 15  ALT     Total cholesterol 225  Triglycerides 109  HDL 73    Hemoglobin A1c 5.5        Immunization History   Administered Date(s) Administered    Influenza Virus Vaccine 10/04/2018    Influenza, High Dose (Fluzone 65 yrs and older) 10/10/2016, 10/05/2017    Pneumococcal 13-valent Conjugate ([id number]) 10/15/2015    Pneumococcal Polysaccharide (Blfpijdhe14) 06/08/2010       Allergies   Allergen Reactions    Aricept [Donepezil Hcl]     Exelon [Rivastigmine Tartrate]     Namenda [Memantine Hcl]        No past medical history on file. Past Surgical History:   Procedure Laterality Date    HAND SURGERY Right     HYSTERECTOMY, TOTAL ABDOMINAL  1984       Social History     Social History    Marital status:      Spouse name: N/A    Number of children: N/A    Years of education: N/A     Occupational History    Not on file.      Social History Main Topics    Smoking status: Not on file    Smokeless tobacco: Not on file    Alcohol use Not on file    Drug use: Unknown    Sexual activity: Not on file     Other Topics Concern    Not on file     Social History Narrative    No narrative on file       Current Outpatient Prescriptions on File Prior to Visit   Medication Sig Dispense

## 2018-11-13 ENCOUNTER — OUTSIDE SERVICES (OUTPATIENT)
Dept: FAMILY MEDICINE CLINIC | Age: 83
End: 2018-11-13
Payer: MEDICARE

## 2018-11-13 DIAGNOSIS — G47.00 INSOMNIA, UNSPECIFIED TYPE: ICD-10-CM

## 2018-11-13 DIAGNOSIS — R53.1 GENERALIZED WEAKNESS: ICD-10-CM

## 2018-11-13 DIAGNOSIS — F41.9 ANXIETY: ICD-10-CM

## 2018-11-13 DIAGNOSIS — L21.9 SEBORRHEIC DERMATITIS: ICD-10-CM

## 2018-11-13 DIAGNOSIS — K59.00 CONSTIPATION, UNSPECIFIED CONSTIPATION TYPE: ICD-10-CM

## 2018-11-13 DIAGNOSIS — F02.80 LATE ONSET ALZHEIMER'S DISEASE WITHOUT BEHAVIORAL DISTURBANCE (HCC): Primary | ICD-10-CM

## 2018-11-13 DIAGNOSIS — G30.1 LATE ONSET ALZHEIMER'S DISEASE WITHOUT BEHAVIORAL DISTURBANCE (HCC): Primary | ICD-10-CM

## 2018-11-13 DIAGNOSIS — R63.4 WEIGHT LOSS: ICD-10-CM

## 2018-11-13 PROCEDURE — 99309 SBSQ NF CARE MODERATE MDM 30: CPT | Performed by: FAMILY MEDICINE

## 2018-12-13 ENCOUNTER — OUTSIDE SERVICES (OUTPATIENT)
Dept: INTERNAL MEDICINE | Age: 83
End: 2018-12-13
Payer: MEDICARE

## 2018-12-13 DIAGNOSIS — R53.1 GENERALIZED WEAKNESS: ICD-10-CM

## 2018-12-13 DIAGNOSIS — G30.1 LATE ONSET ALZHEIMER'S DISEASE WITHOUT BEHAVIORAL DISTURBANCE (HCC): Primary | ICD-10-CM

## 2018-12-13 DIAGNOSIS — F02.80 LATE ONSET ALZHEIMER'S DISEASE WITHOUT BEHAVIORAL DISTURBANCE (HCC): Primary | ICD-10-CM

## 2018-12-13 DIAGNOSIS — G47.00 INSOMNIA, UNSPECIFIED TYPE: ICD-10-CM

## 2018-12-13 DIAGNOSIS — K59.00 CONSTIPATION, UNSPECIFIED CONSTIPATION TYPE: ICD-10-CM

## 2018-12-13 DIAGNOSIS — R63.4 WEIGHT LOSS: ICD-10-CM

## 2018-12-13 DIAGNOSIS — F41.9 ANXIETY: ICD-10-CM

## 2018-12-13 PROCEDURE — 99308 SBSQ NF CARE LOW MDM 20: CPT | Performed by: NURSE PRACTITIONER

## 2019-01-01 ENCOUNTER — OUTSIDE SERVICES (OUTPATIENT)
Dept: FAMILY MEDICINE CLINIC | Age: 84
End: 2019-01-01
Payer: MEDICARE

## 2019-01-01 ENCOUNTER — OUTSIDE SERVICES (OUTPATIENT)
Dept: INTERNAL MEDICINE | Age: 84
End: 2019-01-01
Payer: MEDICARE

## 2019-01-01 DIAGNOSIS — F41.9 CHRONIC ANXIETY: ICD-10-CM

## 2019-01-01 DIAGNOSIS — K59.00 CONSTIPATION, UNSPECIFIED CONSTIPATION TYPE: ICD-10-CM

## 2019-01-01 DIAGNOSIS — F41.9 ANXIETY: ICD-10-CM

## 2019-01-01 DIAGNOSIS — Z91.81 HX OF FALLING: ICD-10-CM

## 2019-01-01 DIAGNOSIS — R53.1 GENERALIZED WEAKNESS: ICD-10-CM

## 2019-01-01 DIAGNOSIS — Z87.898 HISTORY OF WEIGHT LOSS: ICD-10-CM

## 2019-01-01 DIAGNOSIS — F03.91 DEMENTIA WITH BEHAVIORAL DISTURBANCE, UNSPECIFIED DEMENTIA TYPE: ICD-10-CM

## 2019-01-01 DIAGNOSIS — R53.1 WEAKNESS: ICD-10-CM

## 2019-01-01 DIAGNOSIS — M79.606 PAIN OF LOWER EXTREMITY, UNSPECIFIED LATERALITY: ICD-10-CM

## 2019-01-01 DIAGNOSIS — R53.1 WEAKNESS GENERALIZED: ICD-10-CM

## 2019-01-01 DIAGNOSIS — G47.00 INSOMNIA, UNSPECIFIED TYPE: ICD-10-CM

## 2019-01-01 DIAGNOSIS — F03.90 DEMENTIA WITHOUT BEHAVIORAL DISTURBANCE, UNSPECIFIED DEMENTIA TYPE: ICD-10-CM

## 2019-01-01 DIAGNOSIS — L21.9 SEBORRHEIC DERMATITIS: ICD-10-CM

## 2019-01-01 DIAGNOSIS — F03.90 DEMENTIA WITHOUT BEHAVIORAL DISTURBANCE, UNSPECIFIED DEMENTIA TYPE: Primary | ICD-10-CM

## 2019-01-01 DIAGNOSIS — Z51.5 HOSPICE CARE PATIENT: ICD-10-CM

## 2019-01-01 DIAGNOSIS — R63.4 WEIGHT LOSS: ICD-10-CM

## 2019-01-01 DIAGNOSIS — Z71.89 DO NOT RESUSCITATE DISCUSSION: ICD-10-CM

## 2019-01-01 PROCEDURE — 99309 SBSQ NF CARE MODERATE MDM 30: CPT | Performed by: FAMILY MEDICINE

## 2019-01-01 PROCEDURE — 99308 SBSQ NF CARE LOW MDM 20: CPT | Performed by: NURSE PRACTITIONER

## 2019-01-01 RX ORDER — LORAZEPAM 0.5 MG/1
0.5 TABLET ORAL EVERY 6 HOURS PRN
COMMUNITY

## 2019-01-01 RX ORDER — LORAZEPAM 0.5 MG/1
0.25 TABLET ORAL EVERY 4 HOURS PRN
COMMUNITY

## 2019-01-15 ENCOUNTER — OUTSIDE SERVICES (OUTPATIENT)
Dept: FAMILY MEDICINE CLINIC | Age: 84
End: 2019-01-15
Payer: MEDICARE

## 2019-01-15 DIAGNOSIS — R63.4 WEIGHT LOSS: ICD-10-CM

## 2019-01-15 DIAGNOSIS — K59.00 CONSTIPATION, UNSPECIFIED CONSTIPATION TYPE: ICD-10-CM

## 2019-01-15 DIAGNOSIS — G47.00 INSOMNIA, UNSPECIFIED TYPE: ICD-10-CM

## 2019-01-15 DIAGNOSIS — G30.1 LATE ONSET ALZHEIMER'S DISEASE WITHOUT BEHAVIORAL DISTURBANCE (HCC): Primary | ICD-10-CM

## 2019-01-15 DIAGNOSIS — F41.9 ANXIETY: ICD-10-CM

## 2019-01-15 DIAGNOSIS — L21.9 SEBORRHEIC DERMATITIS: ICD-10-CM

## 2019-01-15 DIAGNOSIS — F02.80 LATE ONSET ALZHEIMER'S DISEASE WITHOUT BEHAVIORAL DISTURBANCE (HCC): Primary | ICD-10-CM

## 2019-01-15 DIAGNOSIS — R53.1 GENERALIZED WEAKNESS: ICD-10-CM

## 2019-01-22 PROCEDURE — 99309 SBSQ NF CARE MODERATE MDM 30: CPT | Performed by: FAMILY MEDICINE

## 2019-02-12 ENCOUNTER — OUTSIDE SERVICES (OUTPATIENT)
Dept: INTERNAL MEDICINE | Age: 84
End: 2019-02-12
Payer: MEDICARE

## 2019-02-12 DIAGNOSIS — Z87.898 HISTORY OF WEIGHT LOSS: ICD-10-CM

## 2019-02-12 DIAGNOSIS — F41.9 ANXIETY: ICD-10-CM

## 2019-02-12 DIAGNOSIS — R53.1 GENERALIZED WEAKNESS: ICD-10-CM

## 2019-02-12 DIAGNOSIS — F02.80 LATE ONSET ALZHEIMER'S DISEASE WITHOUT BEHAVIORAL DISTURBANCE (HCC): Primary | ICD-10-CM

## 2019-02-12 DIAGNOSIS — K59.00 CONSTIPATION, UNSPECIFIED CONSTIPATION TYPE: ICD-10-CM

## 2019-02-12 DIAGNOSIS — G30.1 LATE ONSET ALZHEIMER'S DISEASE WITHOUT BEHAVIORAL DISTURBANCE (HCC): Primary | ICD-10-CM

## 2019-02-12 PROCEDURE — 99308 SBSQ NF CARE LOW MDM 20: CPT | Performed by: NURSE PRACTITIONER

## 2019-03-05 ENCOUNTER — OUTSIDE SERVICES (OUTPATIENT)
Dept: FAMILY MEDICINE CLINIC | Age: 84
End: 2019-03-05
Payer: MEDICARE

## 2019-03-20 PROCEDURE — 99309 SBSQ NF CARE MODERATE MDM 30: CPT | Performed by: FAMILY MEDICINE

## 2019-04-02 ENCOUNTER — OUTSIDE SERVICES (OUTPATIENT)
Dept: INTERNAL MEDICINE | Age: 84
End: 2019-04-02
Payer: MEDICARE

## 2019-04-02 DIAGNOSIS — Z87.898 HISTORY OF WEIGHT LOSS: ICD-10-CM

## 2019-04-02 DIAGNOSIS — R53.1 GENERALIZED WEAKNESS: ICD-10-CM

## 2019-04-02 DIAGNOSIS — G47.00 INSOMNIA, UNSPECIFIED TYPE: ICD-10-CM

## 2019-04-02 DIAGNOSIS — F03.90 DEMENTIA WITHOUT BEHAVIORAL DISTURBANCE, UNSPECIFIED DEMENTIA TYPE: Primary | ICD-10-CM

## 2019-04-02 DIAGNOSIS — F41.9 ANXIETY: ICD-10-CM

## 2019-04-02 PROCEDURE — 99308 SBSQ NF CARE LOW MDM 20: CPT | Performed by: NURSE PRACTITIONER

## 2019-04-02 NOTE — PROGRESS NOTES
Transportation needs:     Medical: Not on file     Non-medical: Not on file   Tobacco Use    Smoking status: Not on file   Substance and Sexual Activity    Alcohol use: Not on file    Drug use: Not on file    Sexual activity: Not on file   Lifestyle    Physical activity:     Days per week: Not on file     Minutes per session: Not on file    Stress: Not on file   Relationships    Social connections:     Talks on phone: Not on file     Gets together: Not on file     Attends Oriental orthodox service: Not on file     Active member of club or organization: Not on file     Attends meetings of clubs or organizations: Not on file     Relationship status: Not on file    Intimate partner violence:     Fear of current or ex partner: Not on file     Emotionally abused: Not on file     Physically abused: Not on file     Forced sexual activity: Not on file   Other Topics Concern    Not on file   Social History Narrative    Not on file       Current Outpatient Medications on File Prior to Visit   Medication Sig Dispense Refill    sertraline (ZOLOFT) 50 MG tablet Take 75 mg by mouth daily       bisacodyl (DULCOLAX) 10 MG suppository Place 10 mg rectally daily as needed for Constipation (Constipation)       Wheat Dextrin (BENEFIBER PO) Take by mouth 2 times daily      ARIPiprazole (ABILIFY) 2 MG tablet Take 2 mg by mouth nightly      Melatonin 10 MG TABS Take 1 tablet by mouth nightly      ketoconazole (NIZORAL) 2 % shampoo Apply topically Twice a Week Apply topically daily as needed.  magnesium hydroxide (MILK OF MAGNESIA) 400 MG/5ML suspension Take 30 mLs by mouth as needed for Constipation      acetaminophen (TYLENOL) 325 MG tablet Take 650 mg by mouth as needed for Pain      carbamide peroxide (DEBROX) 6.5 % otic solution as needed       No current facility-administered medications on file prior to visit.         Review of Systems   Unable to perform ROS: Dementia        Physical Exam   Constitutional: She appears related to progressing dementia. 2. Generalized weakness.  Stable/improved  3. Anxiety. per nursing staff has had increased episodes of anxiety, will increase her Zoloft to 75 mg daily- close follow-up of mood  4. History of constipation. No acute concerns at present  5. Seborrheic dermatitis. Quiescent   6. Weight loss. She had improved slightly.  She is currently down a couple pounds showing a little decreased interest in eating.  Continue to offer supplements and snacks and continue to monitor weight at her monthly checks. 7. Insomnia. Stable on melatonin      Remainder of chronic health conditions stableand unchanged  Plan as noted above- will follow up for routine monthly rounds. They will call sooner with any concerns prior.      Electronically signed by OLEG Gallardo CNP on 4/2/2019 at 7:33 AM

## 2019-04-04 LAB
AVERAGE GLUCOSE: NORMAL
CREATININE: 0.6 MG/DL
HBA1C MFR BLD: 5.5 %
POTASSIUM (K+): 4.4

## 2019-05-07 ENCOUNTER — OUTSIDE SERVICES (OUTPATIENT)
Dept: FAMILY MEDICINE CLINIC | Age: 84
End: 2019-05-07
Payer: MEDICARE

## 2019-05-07 DIAGNOSIS — K59.00 CONSTIPATION, UNSPECIFIED CONSTIPATION TYPE: ICD-10-CM

## 2019-05-07 DIAGNOSIS — F41.9 ANXIETY: ICD-10-CM

## 2019-05-07 DIAGNOSIS — R53.1 WEAKNESS: ICD-10-CM

## 2019-05-07 DIAGNOSIS — L21.9 SEBORRHEIC DERMATITIS: ICD-10-CM

## 2019-05-07 DIAGNOSIS — F02.818 LATE ONSET ALZHEIMER'S DISEASE WITH BEHAVIORAL DISTURBANCE (HCC): ICD-10-CM

## 2019-05-07 DIAGNOSIS — R63.4 WEIGHT LOSS: ICD-10-CM

## 2019-05-07 DIAGNOSIS — G47.00 INSOMNIA, UNSPECIFIED TYPE: ICD-10-CM

## 2019-05-07 DIAGNOSIS — G30.1 LATE ONSET ALZHEIMER'S DISEASE WITH BEHAVIORAL DISTURBANCE (HCC): ICD-10-CM

## 2019-05-17 NOTE — PROGRESS NOTES
[facility]  Date of Service:  5/7/2019  Evita Malik  Date of Birth 1935  MRN: [id number]  Code Status----FULL     CHIEF COMPLAINT: Followup for routine visit and review of chronic health conditions.     HPI:   This is an 82 y.o. female patient who is being seen for chronic health conditions on routine monthly rounds. She remains partially verbal, somewhat mumbling and off task. She still has some restlessness and pacing through the facility but generally walking safely. She is getting increasing problems with dementia and much more agitation with care. She is particularly sensitive to staff helping her undress and bathe and toileting activities. She is often verbally and physically aggressive with staff. She continues to have some anxiety and restless behaviors. Zoloft was increased. She is still finding some significant agitation making her care difficult for the aides.      LABS:   November 11, 2017  WBC 5.7  Hemoglobin 12.5  Hematocrit 36.8  Platelets 850     Glucose 67  Sodium 140  Potassium 4.5  BUN 14  Creatinine 0.6  Calcium 9.1  Albumin 3.5  Alkaline phosphorus 98  AST 15  ALT     Total cholesterol 225  Triglycerides 109  HDL 73    Hemoglobin A1c 5.5       Immunization History   Administered Date(s) Administered    Influenza Virus Vaccine 10/04/2018    Influenza, High Dose (Fluzone 65 yrs and older) 10/10/2016, 10/05/2017    Pneumococcal 13-valent Conjugate ([id number]) 10/19/2015    Pneumococcal Polysaccharide ([id number]) 06/07/2010       Allergies   Allergen Reactions    Aricept [Donepezil Hcl]     Exelon [Rivastigmine Tartrate]     Namenda [Memantine Hcl]        No past medical history on file.     Past Surgical History:   Procedure Laterality Date    HAND SURGERY Right     HYSTERECTOMY, TOTAL ABDOMINAL  1984       Social History     Socioeconomic History    Marital status:      Spouse name: Not on file    Number of children: Not on file    Years of education: Not on file    Highest education level: Not on file   Occupational History    Not on file   Social Needs    Financial resource strain: Not on file    Food insecurity:     Worry: Not on file     Inability: Not on file    Transportation needs:     Medical: Not on file     Non-medical: Not on file   Tobacco Use    Smoking status: Not on file   Substance and Sexual Activity    Alcohol use: Not on file    Drug use: Not on file    Sexual activity: Not on file   Lifestyle    Physical activity:     Days per week: Not on file     Minutes per session: Not on file    Stress: Not on file   Relationships    Social connections:     Talks on phone: Not on file     Gets together: Not on file     Attends Baptism service: Not on file     Active member of club or organization: Not on file     Attends meetings of clubs or organizations: Not on file     Relationship status: Not on file    Intimate partner violence:     Fear of current or ex partner: Not on file     Emotionally abused: Not on file     Physically abused: Not on file     Forced sexual activity: Not on file   Other Topics Concern    Not on file   Social History Narrative    Not on file       Current Outpatient Medications   Medication Sig Dispense Refill    sertraline (ZOLOFT) 50 MG tablet Take 75 mg by mouth daily       bisacodyl (DULCOLAX) 10 MG suppository Place 10 mg rectally daily as needed for Constipation (Constipation)       Wheat Dextrin (BENEFIBER PO) Take by mouth 2 times daily      ARIPiprazole (ABILIFY) 2 MG tablet Take 2 mg by mouth nightly      Melatonin 10 MG TABS Take 1 tablet by mouth nightly      ketoconazole (NIZORAL) 2 % shampoo Apply topically Twice a Week Apply topically daily as needed.       magnesium hydroxide (MILK OF MAGNESIA) 400 MG/5ML suspension Take 30 mLs by mouth as needed for Constipation      acetaminophen (TYLENOL) 325 MG tablet Take 650 mg by mouth as needed for Pain      carbamide peroxide (DEBROX) 6.5 % otic solution as needed       No current facility-administered medications for this visit. REVIEW OF SYSTEMS:  Unable to perform ROS: Dementia       PHYSICAL EXAM:  Vital Signs:  Weight 119.2 pounds. Respiratory rate 18. Blood pressure 105/66. Temperature 97.1. Pulse 62. SPO2 93% on room air. Constitutional: She generally looks well. HENT:   Head: Normocephalic and atraumatic. Right Ear: External ear normal.   Left Ear: External ear normal.   Eyes: Right eye exhibits no discharge. Left eye exhibits no discharge. Neck: No tracheal deviation present. Pulmonary/Chest: Lungs sound clear. Abdominal: Soft, nontender, nondistended. Musculoskeletal: Extremities are well perfused. No edema. Neurological: She is generally cooperative today. She is mumbling most of the time, some of it is about lunch and she does answer some simple questions at times but difficult for her to sit still for an extended period. Skin: Skin is warm and dry. Capillary refill takes less than 2 seconds. No rash noted. She is not diaphoretic. No pallor. Psychiatric: She has a normal mood and affect. Her behavior is normal.   Nursing note and vitals reviewed.     ASSESSMENT/PLAN:  1. 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 She has done well on Abilify but with some progression in the dementia she is having much more agitation, resistance and physical aggression mostly related to ADLs with staff. We are going to trial increase in her Abilify. We are going to increase to 2 mg after breakfast and 2 mg at bedtime over the interval period to see if we can decrease some of her anxiety and agitation. She is also having some difficulty maintaining weight with some weight concerns off and on and this may help improve her appetite and help her maintain a better weight. 2. Generalized weakness.  Stable/improved  3.  Anxiety. per nursing staff has had increased episodes of anxiety, will increase her Zoloft to 75 mg daily- close follow-up of mood  4. History of constipation. No acute concerns at present  5. Seborrheic dermatitis. Quiescent   6. Weight loss. She had improved slightly.  She is currently down a couple pounds showing a little decreased interest in eating.  Continue to offer supplements and snacks and continue to monitor weight at her monthly checks. 7. Insomnia. Stable on melatonin     Remainder of chronic health conditions stable and unchanged  Plan as noted above. Will follow up for routine monthly rounds. They will call sooner with any concerns prior. Gualberto Diaz am personally transcribing for Didier Pedroza MD 5/17/19 at 11:19 AM.    I, Didier Pedroza MD, personally performed the services described in this document as transcribed by García Santiago, and it is both accurate and complete.     Electronically signed by Didier Pedroza MD on 5/30/2019 at 7:55 PM

## 2019-05-28 PROCEDURE — 99309 SBSQ NF CARE MODERATE MDM 30: CPT | Performed by: FAMILY MEDICINE

## 2019-06-11 ENCOUNTER — OUTSIDE SERVICES (OUTPATIENT)
Dept: INTERNAL MEDICINE | Age: 84
End: 2019-06-11
Payer: MEDICARE

## 2019-06-11 DIAGNOSIS — F03.90 DEMENTIA WITHOUT BEHAVIORAL DISTURBANCE, UNSPECIFIED DEMENTIA TYPE: Primary | ICD-10-CM

## 2019-06-11 DIAGNOSIS — F41.9 ANXIETY: ICD-10-CM

## 2019-06-11 DIAGNOSIS — R53.1 GENERALIZED WEAKNESS: ICD-10-CM

## 2019-06-11 DIAGNOSIS — G47.00 INSOMNIA, UNSPECIFIED TYPE: ICD-10-CM

## 2019-06-11 PROCEDURE — 1123F ACP DISCUSS/DSCN MKR DOCD: CPT | Performed by: NURSE PRACTITIONER

## 2019-06-11 PROCEDURE — 99308 SBSQ NF CARE LOW MDM 20: CPT | Performed by: NURSE PRACTITIONER

## 2019-06-11 NOTE — PROGRESS NOTES
[facility]  Date of Service: 06/11/19  Palmira Yan  Date of Birth 1935  Code Status----FULL     CHIEF COMPLAINT: Followup for routine visit and review of chronic health conditions.     HPI:     61-year-old patient with progressive dementia being seen for routine monthly rounds and review of chronic health conditions. Previous month she had some issues with restlessness and pacing throughout the facility and at that time her Zoloft was increased. She also has since had her Abilify increased to twice a day. Staff states mood has been better. Unfortunately did have falls on 6-6 and 6-7 without any injuries. Has been continuing to eat and drink without difficulty. Vitals have remained stable. Nursing staff deny any nursing service concerns at present      LABS:   November 11, 2017  WBC 5.7  Hemoglobin 12.5  Hematocrit 36.8  Platelets 290     Glucose 67  Sodium 140  Potassium 4.5  BUN 14  Creatinine 0.6  Calcium 9.1  Albumin 3.5  Alkaline phosphorus 98  AST 15  ALT     Total cholesterol 225  Triglycerides 109  HDL 73    Hemoglobin A1c 5.5             Immunization History   Administered Date(s) Administered    Influenza Virus Vaccine 10/04/2018    Influenza, High Dose (Fluzone 65 yrs and older) 10/10/2016, 10/05/2017    Pneumococcal 13-valent Conjugate ([id number]) 10/19/2015    Pneumococcal Polysaccharide ([id number]) 06/07/2010       Allergies   Allergen Reactions    Aricept [Donepezil Hcl]     Exelon [Rivastigmine Tartrate]     Namenda [Memantine Hcl]        No past medical history on file.     Past Surgical History:   Procedure Laterality Date    HAND SURGERY Right     HYSTERECTOMY, TOTAL ABDOMINAL  1984       Social History     Socioeconomic History    Marital status:      Spouse name: Not on file    Number of children: Not on file    Years of education: Not on file    Highest education level: Not on file   Occupational History    Not on file Social Needs    Financial resource strain: Not on file    Food insecurity:     Worry: Not on file     Inability: Not on file    Transportation needs:     Medical: Not on file     Non-medical: Not on file   Tobacco Use    Smoking status: Not on file   Substance and Sexual Activity    Alcohol use: Not on file    Drug use: Not on file    Sexual activity: Not on file   Lifestyle    Physical activity:     Days per week: Not on file     Minutes per session: Not on file    Stress: Not on file   Relationships    Social connections:     Talks on phone: Not on file     Gets together: Not on file     Attends Holiness service: Not on file     Active member of club or organization: Not on file     Attends meetings of clubs or organizations: Not on file     Relationship status: Not on file    Intimate partner violence:     Fear of current or ex partner: Not on file     Emotionally abused: Not on file     Physically abused: Not on file     Forced sexual activity: Not on file   Other Topics Concern    Not on file   Social History Narrative    Not on file       Current Outpatient Medications   Medication Sig Dispense Refill    sertraline (ZOLOFT) 50 MG tablet Take 75 mg by mouth daily       bisacodyl (DULCOLAX) 10 MG suppository Place 10 mg rectally daily as needed for Constipation (Constipation)       Wheat Dextrin (BENEFIBER PO) Take by mouth 2 times daily      ARIPiprazole (ABILIFY) 2 MG tablet Take 2 mg by mouth 2 times daily       Melatonin 10 MG TABS Take 1 tablet by mouth nightly      ketoconazole (NIZORAL) 2 % shampoo Apply topically Twice a Week Apply topically daily as needed.  magnesium hydroxide (MILK OF MAGNESIA) 400 MG/5ML suspension Take 30 mLs by mouth as needed for Constipation      acetaminophen (TYLENOL) 325 MG tablet Take 650 mg by mouth as needed for Pain      carbamide peroxide (DEBROX) 6.5 % otic solution as needed       No current facility-administered medications for this visit. Review of Systems   Unable to perform ROS: Dementia        Physical Exam   Constitutional: She appears well-developed and well-nourished. No distress. HENT:   Head: Normocephalic and atraumatic. Right Ear: External ear normal.   Left Ear: External ear normal.   Eyes: Right eye exhibits no discharge. Left eye exhibits no discharge. Neck: No tracheal deviation present. Cardiovascular: Normal rate, regular rhythm, normal heart sounds and intact distal pulses. Exam reveals no gallop and no friction rub. No murmur heard. Pulmonary/Chest: Effort normal and breath sounds normal. No respiratory distress. She has no wheezes. She has no rales. She exhibits no tenderness. Abdominal: Soft. Bowel sounds are normal. She exhibits no distension. There is no tenderness. Musculoskeletal: She exhibits no edema. Neurological: She is alert. No sensory deficit. Coordination abnormal.   Skin: Skin is warm and dry. Capillary refill takes less than 2 seconds. No rash noted. She is not diaphoretic. No pallor. Psychiatric: She has a normal mood and affect. Nursing note and vitals reviewed. Patient sitting up at table eating breakfast with the assistant of the staff without any behavioral outbursts. Vital signs: Temperature: 97.6 °F, blood pressure 121/75, pulse 65, respirations 20, SPO2 93% on room air, weight 121.5 pounds      ASSESSMENT/PLAN:  1. 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 She has done well on Abilify but with some progression in the dementia she is having much more agitation, resistance and physical aggression mostly related to ADLs with staff. Abilify was increased at last visit. Patient's agitation much improved. Weights up 2 pounds. Appetite good. 2. Generalized weakness.  Stable/improved  3. Anxiety stable at present- continue to monitor   4. History of constipation. No acute concerns at present  5. Seborrheic dermatitis. Quiescent   6. Weight loss.  She had improved slightly. Jamar Nair is currently down a couple pounds showing a little decreased interest in eating.  Continue to offer supplements and snacks and continue to monitor weight at her monthly checks. 7. Insomnia. Stable on melatonin     Remainder of chronic health conditions stableand unchanged  Plan as noted above- will follow up for routine monthly rounds. They will call sooner with any concerns prior.      Electronically signed by OLEG Rankin CNP on 6/11/2019 at 7:57 AM
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
## 2019-08-16 NOTE — PROGRESS NOTES
education level: Not on file   Occupational History    Not on file   Social Needs    Financial resource strain: Not on file    Food insecurity:     Worry: Not on file     Inability: Not on file    Transportation needs:     Medical: Not on file     Non-medical: Not on file   Tobacco Use    Smoking status: Not on file   Substance and Sexual Activity    Alcohol use: Not on file    Drug use: Not on file    Sexual activity: Not on file   Lifestyle    Physical activity:     Days per week: Not on file     Minutes per session: Not on file    Stress: Not on file   Relationships    Social connections:     Talks on phone: Not on file     Gets together: Not on file     Attends Orthodox service: Not on file     Active member of club or organization: Not on file     Attends meetings of clubs or organizations: Not on file     Relationship status: Not on file    Intimate partner violence:     Fear of current or ex partner: Not on file     Emotionally abused: Not on file     Physically abused: Not on file     Forced sexual activity: Not on file   Other Topics Concern    Not on file   Social History Narrative    Not on file       Current Outpatient Medications on File Prior to Visit   Medication Sig Dispense Refill    sertraline (ZOLOFT) 50 MG tablet Take 75 mg by mouth daily       bisacodyl (DULCOLAX) 10 MG suppository Place 10 mg rectally daily as needed for Constipation (Constipation)       Wheat Dextrin (BENEFIBER PO) Take by mouth 2 times daily      ARIPiprazole (ABILIFY) 2 MG tablet Take 2 mg by mouth 2 times daily       Melatonin 10 MG TABS Take 1 tablet by mouth nightly      ketoconazole (NIZORAL) 2 % shampoo Apply topically Twice a Week Apply topically daily as needed.       magnesium hydroxide (MILK OF MAGNESIA) 400 MG/5ML suspension Take 30 mLs by mouth as needed for Constipation      acetaminophen (TYLENOL) 325 MG tablet Take 650 mg by mouth as needed for Pain      carbamide peroxide (DEBROX) 6.5 %

## 2019-09-04 NOTE — PROGRESS NOTES
with history of hallucinations, recurrent agitation, and physical aggression towards staff usually around ADLs. Abilify has worked quite good from the start and when needed small dose increase has brought further efficacy. This is also currently helping stabilize her weight which has been on a downward trend. Based on multiple dosage reduction trials and their consequences we are going to recommend that currently a gradual dose reduction plan is clinically contraindicated in this lady. We will continue to watch for side effects and interaction concerns and address the dose as we go. 2. Generalized weakness.  Stable/improved  3. Chronic anxiety. Stable at present. Heidi Murillo is on Zoloft 75 mg a day with plans to keep on current dosing. 4. History of constipation. No acute concerns at present  5. Seborrheic dermatitis. Quiescent   6. Hx of Weight loss. Currently improved.  Her weight is relatively stable over the last month.  She has shown decreased interest in eating related to her dementia progressing. We will continue to offer supplements and snacks and continue to monitor her weight. May be getting some secondary gain from using the Abilify which is known to increase appetite and weight gain. 7. Insomnia. Stable on melatonin. 8. Question of some leg pain recently reported to nursing on standing. We are going to schedule her Tylenol twice a day with a third dose as available as needed and continue to monitor. No signs of injury at this time.     Remainder of chronic health conditions stable and unchanged  Plan as noted above. Will follow up for routine monthly rounds. They will call sooner with any concerns prior. Tc Rodriges am personally transcribing for Marcellus Dennis MD 9/4/19 at 10:28 AM.    I, Marcellus Dennis MD, personally performed the services described in this document as transcribed by Yassine Patterson, and it is both accurate and complete.      Electronically signed by Sesar Graves Yesi Carias MD on 9/19/2019 at 10:19 AM

## 2019-12-17 PROBLEM — Z51.5 HOSPICE CARE PATIENT: Status: ACTIVE | Noted: 2019-01-01

## 2019-12-17 PROBLEM — Z71.89 DO NOT RESUSCITATE DISCUSSION: Status: ACTIVE | Noted: 2019-01-01

## 2020-01-01 ENCOUNTER — APPOINTMENT (OUTPATIENT)
Dept: CT IMAGING | Age: 85
End: 2020-01-01
Payer: MEDICARE

## 2020-01-01 ENCOUNTER — TELEPHONE (OUTPATIENT)
Dept: FAMILY MEDICINE CLINIC | Age: 85
End: 2020-01-01

## 2020-01-01 ENCOUNTER — HOSPITAL ENCOUNTER (EMERGENCY)
Age: 85
Discharge: HOME | End: 2020-06-22
Attending: EMERGENCY MEDICINE
Payer: MEDICARE

## 2020-01-01 ENCOUNTER — OUTSIDE SERVICES (OUTPATIENT)
Dept: INTERNAL MEDICINE | Age: 85
End: 2020-01-01
Payer: MEDICARE

## 2020-01-01 ENCOUNTER — OUTSIDE SERVICES (OUTPATIENT)
Dept: FAMILY MEDICINE CLINIC | Age: 85
End: 2020-01-01
Payer: MEDICARE

## 2020-01-01 ENCOUNTER — HOSPITAL ENCOUNTER (EMERGENCY)
Age: 85
Discharge: HOME | End: 2020-06-18
Attending: EMERGENCY MEDICINE
Payer: MEDICARE

## 2020-01-01 ENCOUNTER — TELEPHONE (OUTPATIENT)
Dept: FAMILY MEDICINE CLINIC | Age: 85
End: 2020-01-01
Payer: MEDICARE

## 2020-01-01 VITALS
TEMPERATURE: 98.7 F | SYSTOLIC BLOOD PRESSURE: 124 MMHG | RESPIRATION RATE: 16 BRPM | DIASTOLIC BLOOD PRESSURE: 64 MMHG | OXYGEN SATURATION: 96 % | HEART RATE: 83 BPM

## 2020-01-01 VITALS
OXYGEN SATURATION: 92 % | DIASTOLIC BLOOD PRESSURE: 69 MMHG | TEMPERATURE: 98.8 F | RESPIRATION RATE: 18 BRPM | SYSTOLIC BLOOD PRESSURE: 152 MMHG | HEART RATE: 77 BPM

## 2020-01-01 PROCEDURE — 70450 CT HEAD/BRAIN W/O DYE: CPT

## 2020-01-01 PROCEDURE — 99308 SBSQ NF CARE LOW MDM 20: CPT | Performed by: NURSE PRACTITIONER

## 2020-01-01 PROCEDURE — 99308 SBSQ NF CARE LOW MDM 20: CPT | Performed by: FAMILY MEDICINE

## 2020-01-01 PROCEDURE — 99282 EMERGENCY DEPT VISIT SF MDM: CPT

## 2020-01-01 PROCEDURE — 99284 EMERGENCY DEPT VISIT MOD MDM: CPT

## 2020-01-01 PROCEDURE — 72125 CT NECK SPINE W/O DYE: CPT

## 2020-01-01 PROCEDURE — 70486 CT MAXILLOFACIAL W/O DYE: CPT

## 2020-01-01 PROCEDURE — 99309 SBSQ NF CARE MODERATE MDM 30: CPT | Performed by: FAMILY MEDICINE

## 2020-01-01 PROCEDURE — 12013 RPR F/E/E/N/L/M 2.6-5.0 CM: CPT

## 2020-01-01 PROCEDURE — G0179 MD RECERTIFICATION HHA PT: HCPCS | Performed by: FAMILY MEDICINE

## 2020-01-01 PROCEDURE — 6370000000 HC RX 637 (ALT 250 FOR IP): Performed by: EMERGENCY MEDICINE

## 2020-01-01 RX ORDER — MORPHINE SULFATE 100 MG/5ML
5 SOLUTION ORAL
COMMUNITY

## 2020-01-01 RX ORDER — IBUPROFEN 400 MG/1
400 TABLET ORAL EVERY 6 HOURS PRN
COMMUNITY

## 2020-01-01 RX ORDER — DIAPER,BRIEF,INFANT-TODD,DISP
EACH MISCELLANEOUS ONCE
Status: COMPLETED | OUTPATIENT
Start: 2020-01-01 | End: 2020-01-01

## 2020-01-01 RX ADMIN — BACITRACIN ZINC: 500 OINTMENT TOPICAL at 20:48

## 2020-01-01 SDOH — HEALTH STABILITY: MENTAL HEALTH: HOW OFTEN DO YOU HAVE A DRINK CONTAINING ALCOHOL?: NEVER

## 2020-01-01 ASSESSMENT — PAIN SCALES - GENERAL: PAINLEVEL_OUTOF10: 6

## 2020-01-30 NOTE — PROGRESS NOTES
on file     Non-medical: Not on file   Tobacco Use    Smoking status: Not on file   Substance and Sexual Activity    Alcohol use: Not on file    Drug use: Not on file    Sexual activity: Not on file   Lifestyle    Physical activity:     Days per week: Not on file     Minutes per session: Not on file    Stress: Not on file   Relationships    Social connections:     Talks on phone: Not on file     Gets together: Not on file     Attends Buddhist service: Not on file     Active member of club or organization: Not on file     Attends meetings of clubs or organizations: Not on file     Relationship status: Not on file    Intimate partner violence:     Fear of current or ex partner: Not on file     Emotionally abused: Not on file     Physically abused: Not on file     Forced sexual activity: Not on file   Other Topics Concern    Not on file   Social History Narrative    Not on file       Current Outpatient Medications   Medication Sig Dispense Refill    LORazepam (ATIVAN) 0.5 MG tablet Take 0.25 mg by mouth 2 times daily.  LORazepam (ATIVAN) 0.5 MG tablet Take 0.25 mg by mouth every 6 hours as needed for Anxiety.  sertraline (ZOLOFT) 50 MG tablet Take 75 mg by mouth daily       bisacodyl (DULCOLAX) 10 MG suppository Place 10 mg rectally daily as needed for Constipation (Constipation)       Wheat Dextrin (BENEFIBER PO) Take by mouth 2 times daily      ARIPiprazole (ABILIFY) 2 MG tablet Take 2 mg by mouth 2 times daily       Melatonin 10 MG TABS Take 1 tablet by mouth nightly      ketoconazole (NIZORAL) 2 % shampoo Apply topically Twice a Week Apply topically daily as needed.       magnesium hydroxide (MILK OF MAGNESIA) 400 MG/5ML suspension Take 30 mLs by mouth as needed for Constipation      acetaminophen (TYLENOL) 325 MG tablet Take 650 mg by mouth 3 times daily And once daily, PRN      carbamide peroxide (DEBROX) 6.5 % otic solution as needed       No current facility-administered medications for this visit. REVIEW OF SYSTEMS:  Unable to check because of her dementia. PHYSICAL EXAM:  Vital Signs:  Weight 119.1  pounds. Respiratory rate 16. Blood pressure 115/64. Temperature 97.8. Pulse 71 and regular. SPO2 96% on room air. Constitutional: She generally eating with some assistance today. Speech is more nonsensical today. HENT:   Head: Normocephalic and atraumatic. Right Ear: External ear normal.   Left Ear: External ear normal.   Eyes: Right eye exhibits no discharge. Left eye exhibits no discharge. Neck: Supple. No lymphadenopathy. No bruits over the carotids. Cardiovascular: Regular rate and rhythm. Pulmonary/Chest: Lungs clear. Abdominal: Soft, flat. Active bowel sounds noted. No palpable mass. No tenderness. No peripheral edema. Musculoskeletal: She exhibits no edema. Neurological: She is alert. No cranial nerve deficit.   Skin: Skin is warm, well perfused. No rash. No evidence of skin lesions of concern. Psychiatric: She has a normal mood and affect. Her behavior is normal.   Nursing note and vitals reviewed. ASSESSMENT/PLAN:  1. Dementia, moderate-to-severe with history of hallucinations, recurrent agitation, and physical aggression towards staff usually around ADLs. Staci Ro has worked quite good from the start and when needed small dose increase has brought further efficacy.  This is also currently helping stabilize her weight which has been on a downward trend.  Based on multiple dosage reduction trials and their consequences we are going to recommend that currently a gradual dose reduction plan is clinically contraindicated in this lady. Roslyn Castellano will continue to watch for side effects and interaction concerns and address the dose as we go. 2. Generalized weakness.  Stable/improved  3. Chronic anxiety. Stable at present. Ольга Epps is on Zoloft 75 mg a day with plans to keep on current dosing. 4. History of constipation.  No acute concerns at present  5. Seborrheic dermatitis. Nixon Headings  6. Hx of Weight loss. Currently improved.  Her weight is relatively stable over the last month.  She has shown decreased interest in eating related to her dementia progressing. We will continue to offer supplements and snacks and continue to monitor her weight. May be getting some secondary gain from using the Abilify which is known to increase appetite and weight gain. 7. Insomnia. Stable on melatonin. 8. Previously she is with leg pain currently exhibiting no signs of this. 9. Patient being followed by hospice care as of 11/18/2019 per family's request    Remainder of chronic health conditions stable and unchanged  Plan as noted above. Will follow up for routine monthly rounds. They will call sooner with any concerns prior. Levi CLEMENT, am personally transcribing for Maddi Conde MD 1/30/20 at 12:00 PM.      Maddi CLEMENT MD, personally performed the services described in this document as transcribed by Levi Edwards, and it is both accurate and complete.     Electronically signed by Maddi Conde MD on 2/21/20 at 12:53 PM

## 2020-03-02 NOTE — PROGRESS NOTES
[facility]  Date of Service:  2/25/2020  Nato Sewell  Date of Birth 1935  MRN: [id number]  Code Status----DNR CC. Hospice patient    HPI:  82-year-old female patient with progressive dementia, who was recently admitted to hospice last fall due to progressive decline. Continues to follow with hospice for anxiety medicine. Cooperative with exam today. Nursing staff deny any significant behavioral outbursts. She continues to require the staff for all ADLs. Nursing aide today states that she has been eating very well this week, good oral intakes. They deny any concerns with her bowels or bladder.        LABS:   November 11, 2017  WBC 5.7  Hemoglobin 12.5  Hematocrit 36.8  Platelets 538     Glucose 67  Sodium 140  Potassium 4.5  BUN 14  Creatinine 0.6  Calcium 9.1  Albumin 3.5  Alkaline phosphorus 98  AST 15  ALT     Total cholesterol 225  Triglycerides 109  HDL 73    Hemoglobin A1c 5.5    Immunization History   Administered Date(s) Administered    Influenza Virus Vaccine 10/04/2018, 10/10/2019    Influenza, High Dose (Fluzone 65 yrs and older) 10/10/2016, 10/05/2017    Pneumococcal Conjugate 13-valent ([id number]) 10/19/2015    Pneumococcal Polysaccharide ([id number]) 06/07/2010       Allergies   Allergen Reactions    Aricept [Donepezil Hcl]     Exelon [Rivastigmine Tartrate]     Namenda [Memantine Hcl]        Past Medical History:   Diagnosis Date    Falls 8/14/2019       Past Surgical History:   Procedure Laterality Date    HAND SURGERY Right     HYSTERECTOMY, TOTAL ABDOMINAL  1984       Social History     Socioeconomic History    Marital status:      Spouse name: Not on file    Number of children: Not on file    Years of education: Not on file    Highest education level: Not on file   Occupational History    Not on file   Social Needs    Financial resource strain: Not on file    Food insecurity:     Worry: Not on file     Inability: Not on file   [facility] Transportation needs:     Medical: Not on file     Non-medical: Not on file   Tobacco Use    Smoking status: Not on file   Substance and Sexual Activity    Alcohol use: Not on file    Drug use: Not on file    Sexual activity: Not on file   Lifestyle    Physical activity:     Days per week: Not on file     Minutes per session: Not on file    Stress: Not on file   Relationships    Social connections:     Talks on phone: Not on file     Gets together: Not on file     Attends Holiness service: Not on file     Active member of club or organization: Not on file     Attends meetings of clubs or organizations: Not on file     Relationship status: Not on file    Intimate partner violence:     Fear of current or ex partner: Not on file     Emotionally abused: Not on file     Physically abused: Not on file     Forced sexual activity: Not on file   Other Topics Concern    Not on file   Social History Narrative    Not on file       Current Outpatient Medications   Medication Sig Dispense Refill    magnesium hydroxide (MILK OF MAGNESIA) 400 MG/5ML suspension Take 30 mLs by mouth daily as needed for Constipation      LORazepam (ATIVAN) 0.5 MG tablet Take 0.25 mg by mouth 2 times daily.  LORazepam (ATIVAN) 0.5 MG tablet Take 0.25 mg by mouth every 6 hours as needed for Anxiety.  sertraline (ZOLOFT) 50 MG tablet Take 75 mg by mouth daily       bisacodyl (DULCOLAX) 10 MG suppository Place 10 mg rectally daily as needed for Constipation (Constipation)       Wheat Dextrin (BENEFIBER PO) Take by mouth 2 times daily      ARIPiprazole (ABILIFY) 2 MG tablet Take 2 mg by mouth 2 times daily       Melatonin 10 MG TABS Take 1 tablet by mouth nightly      ketoconazole (NIZORAL) 2 % shampoo Apply topically Twice a Week Apply topically daily as needed.       acetaminophen (TYLENOL) 325 MG tablet Take 650 mg by mouth 3 times daily And every 4 hours PRN      carbamide peroxide (DEBROX) 6.5 % otic solution as needed

## 2020-03-23 NOTE — PROGRESS NOTES
Socioeconomic History    Marital status:      Spouse name: Not on file    Number of children: Not on file    Years of education: Not on file    Highest education level: Not on file   Occupational History    Not on file   Social Needs    Financial resource strain: Not on file    Food insecurity     Worry: Not on file     Inability: Not on file    Transportation needs     Medical: Not on file     Non-medical: Not on file   Tobacco Use    Smoking status: Not on file   Substance and Sexual Activity    Alcohol use: Not on file    Drug use: Not on file    Sexual activity: Not on file   Lifestyle    Physical activity     Days per week: Not on file     Minutes per session: Not on file    Stress: Not on file   Relationships    Social connections     Talks on phone: Not on file     Gets together: Not on file     Attends Yarsani service: Not on file     Active member of club or organization: Not on file     Attends meetings of clubs or organizations: Not on file     Relationship status: Not on file    Intimate partner violence     Fear of current or ex partner: Not on file     Emotionally abused: Not on file     Physically abused: Not on file     Forced sexual activity: Not on file   Other Topics Concern    Not on file   Social History Narrative    Not on file       Current Outpatient Medications   Medication Sig Dispense Refill    magnesium hydroxide (MILK OF MAGNESIA) 400 MG/5ML suspension Take 30 mLs by mouth daily as needed for Constipation      LORazepam (ATIVAN) 0.5 MG tablet Take 0.25 mg by mouth 2 times daily.  LORazepam (ATIVAN) 0.5 MG tablet Take 0.25 mg by mouth every 6 hours as needed for Anxiety.       sertraline (ZOLOFT) 50 MG tablet Take 75 mg by mouth daily       bisacodyl (DULCOLAX) 10 MG suppository Place 10 mg rectally daily as needed for Constipation (Constipation)       Wheat Dextrin (BENEFIBER PO) Take by mouth 2 times daily      ARIPiprazole (ABILIFY) 2 MG tablet Take 2 mg by mouth 2 times daily       Melatonin 10 MG TABS Take 1 tablet by mouth nightly      ketoconazole (NIZORAL) 2 % shampoo Apply topically Twice a Week Apply topically daily as needed.  acetaminophen (TYLENOL) 325 MG tablet Take 650 mg by mouth 3 times daily And every 4 hours PRN      carbamide peroxide (DEBROX) 6.5 % otic solution as needed       No current facility-administered medications for this visit. PHYSICAL EXAM:  Vital Signs:  Weight 120.6 pounds. Respiratory rate 16. Blood pressure 125/63. Temperature 97.8. Pulse 70. SPO2 97% on room air. Constitutional:    General: We found her in the waiting room standing and walking. She has a tendency to lean heavily on the right leg with the left hip slightly higher in the air causing some lower lumbar curvature. She does not seem to have any focal pain issues. She is mumbling nothing really ineligible. She is generally cooperative but nervous, which is her normal affect. Cardiovascular:   Regular rate and rhythm. Pulmonary:   Lungs sound grossly clear. Abdominal:  Soft. Nontender. No palpable masses. Musculoskeletal:      Extremities are well perfused without edema. No visits with results within 1 Month(s) from this visit. Latest known visit with results is:   Orders Only on 04/08/2019   Component Date Value Ref Range Status    Potassium (K+) 04/04/2019 4.4   Final    Creatinine 04/04/2019 0.6  mg/dL Final    Hemoglobin A1C 04/04/2019 5.5  % Final       ASSESSMENT/PLAN:  1. Dementia, moderate-to-severe with history of hallucinations, recurrent agitation, and physical aggression towards staff usually around ADLs.  Ramona Armando has worked quite good from the start and when needed small dose increase has brought further efficacy.  This is also currently helping stabilize her weight which has been on a downward trend.  Based on multiple dosage reduction trials and their consequences we are going to recommend that currently a gradual dose reduction plan is clinically contraindicated in this lady. Sukh Murillo will continue to watch for side effects and interaction concerns and address the dose as we go. Patient has consulted with hospice per family request, around November 18, 2019. They are helping evaluate her for additional needs. 2. Generalized weakness.  Stable/improved  3. Chronic anxiety. Stable at present. Senia Craft is on Zoloft 75 mg a day with plans to keep on current dosing. 4. History of constipation. No acute concerns at present  5. Seborrheic dermatitis. Johnathon Solimaner  6. Hx of Weight loss. Currently improved.  Her weight is relatively stable over the last month.  She has shown decreased interest in eating related to her dementia progressing. We will continue to offer supplements and snacks and continue to monitor her weight. May be getting some secondary gain from using the Abilify which is known to increase appetite and weight gain. 7. Insomnia. Stable on melatonin. 8. Previously she is with leg pain currently exhibiting no signs of this. 9. Patient being followed by hospice care as of 11/18/2019 per family's request    Remainder of chronic health conditions stable and unchanged  Plan as noted above. Will follow up for routine monthly rounds. They will call sooner with any concerns prior. Anitra Delgado am personally transcribing for Cy Bajwa MD 3/23/20 at 10:53 AM EDT. Eduar Pace MD, personally performed the services described in this document as transcribed by the , and it is both accurate and complete.     Electronically signed by Cy Bajwa MD on 4/1/20 at 3:06 PM EDT

## 2020-04-07 NOTE — TELEPHONE ENCOUNTER
Home health certification and plan of care done 4/7/20 on patient for date services 2/16/20-5/15/20. Verified medications. Physician time spent is 15 minutes for activities to coordinate services, documenting, medical decision making, and review of reports, treatment plans, and test results.

## 2020-04-29 NOTE — PROGRESS NOTES
[facility]  Date of Service: 04/28/20  Hallie Cuenca  Date of Birth 1935  Code [facility] - Hospice patient     HPI:    82-year-old dementia patient being seen for routine monthly rounds and review of chronic health conditions at [facility]. Follow with hospice due to progressive decline. She was cooperative with exam today. They deny any significant behavioral outbursts. Continues to require the assistance of the staff for all of her ADLs. Is sitting up with the nurses aide being fed today. To urinate without difficulty and bowels have been moving routinely. Vitals have remained stable.   Nursing staff deny any nursing service issues    November 11, 2017  WBC 5.7  Hemoglobin 12.5  Hematocrit 36.8  Platelets 549     Glucose 67  Sodium 140  Potassium 4.5  BUN 14  Creatinine 0.6  Calcium 9.1  Albumin 3.5  Alkaline phosphorus 98  AST 15  ALT     Total cholesterol 225  Triglycerides 109  HDL 73    Hemoglobin A1c 5.5              Immunization History   Administered Date(s) Administered    Influenza Virus Vaccine 10/04/2018, 10/10/2019    Influenza, High Dose (Fluzone 65 yrs and older) 10/10/2016, 10/05/2017    Pneumococcal Conjugate 13-valent ([id number]) 10/19/2015    Pneumococcal Polysaccharide ([id number]) 06/07/2010       Allergies   Allergen Reactions    Aricept [Donepezil Hcl]     Exelon [Rivastigmine Tartrate]     Namenda [Memantine Hcl]        Past Medical History:   Diagnosis Date    Anxiety 7/18/2017    Constipation     Dementia with behavioral disturbance ([facility] [place] [address]     Falls 8/14/2019    Insomnia 7/18/2017    Seborrheic dermatitis     Weakness 7/18/2017    Weight loss 7/18/2017    Currently stable (8/24/17)       Past Surgical History:   Procedure Laterality Date    HAND SURGERY Right     HYSTERECTOMY, TOTAL ABDOMINAL  1984       Social History     Socioeconomic History    Marital status:      Spouse name: Not on file    Number of children: Not on file    Years of education: Not on file    Highest education level: Not on file   Occupational History    Not on file   Social Needs    Financial resource strain: Not on file    Food insecurity     Worry: Not on file     Inability: Not on file    Transportation needs     Medical: Not on file     Non-medical: Not on file   Tobacco Use    Smoking status: Not on file   Substance and Sexual Activity    Alcohol use: Not on file    Drug use: Not on file    Sexual activity: Not on file   Lifestyle    Physical activity     Days per week: Not on file     Minutes per session: Not on file    Stress: Not on file   Relationships    Social connections     Talks on phone: Not on file     Gets together: Not on file     Attends Mandaeism service: Not on file     Active member of club or organization: Not on file     Attends meetings of clubs or organizations: Not on file     Relationship status: Not on file    Intimate partner violence     Fear of current or ex partner: Not on file     Emotionally abused: Not on file     Physically abused: Not on file     Forced sexual activity: Not on file   Other Topics Concern    Not on file   Social History Narrative    Not on file       Current Outpatient Medications on File Prior to Visit   Medication Sig Dispense Refill    magnesium hydroxide (MILK OF MAGNESIA) 400 MG/5ML suspension Take 30 mLs by mouth daily as needed for Constipation      LORazepam (ATIVAN) 0.5 MG tablet Take 0.25 mg by mouth 3 times daily.  LORazepam (ATIVAN) 0.5 MG tablet Take 0.25 mg by mouth every 4 hours as needed for Anxiety.        sertraline (ZOLOFT) 50 MG tablet Take 75 mg by mouth daily       bisacodyl (DULCOLAX) 10 MG suppository Place 10 mg rectally daily as needed for Constipation (Constipation)       Wheat Dextrin (BENEFIBER PO) Take by mouth 2 times daily      ARIPiprazole (ABILIFY) 2 MG tablet Take 2 mg by mouth 2 times daily       Melatonin 10 MG TABS Take 1 Behavior: Behavior normal.         Vital signs: Temperature: 98.2 °F, blood pressure 100/67, pulse 63, respirations 16, SPO2 95% on room air, weight 121.4 pounds    ASSESSMENT/PLAN:  1. Dementia, moderate-to-severe with history of hallucinations, recurrent agitation, and physical aggression towards staff usually around ADLs. Casi Argueta has worked quite good from the start and when needed small dose increase has brought further efficacy.  This is also currently helping stabilize her weight which has been on a downward trend.  Based on multiple dosage reduction trials and their consequences we are going to recommend that currently a gradual dose reduction plan is clinically contraindicated in this lady. Brook Martin will continue to watch for side effects and interaction concerns and address the dose as we go. Patient has consulted with hospice per family request, around November 18, 2019. They are helping evaluate her for additional needs. 2. Generalized weakness.  Stable/improved  3. Chronic anxiety. Stable at present. Rakesh Sotelo is on Zoloft 75 mg a day with plans to keep on current dosing. 4. History of constipation. No acute concerns at present  5. Seborrheic dermatitis. Lee Isaac  6. Hx of Weight loss. Currently improved.  Her weight is relatively stable over the last month.  She has shown decreased interest in eating related to her dementia progressing. We will continue to offer supplements and snacks and continue to monitor her weight. May be getting some secondary gain from using the Abilify which is known to increase appetite and weight gain. 7. Insomnia. Stable on melatonin. 8. Previously she is with leg pain currently exhibiting no signs of this. 9. Patient being followed by hospice care as of 11/18/2019 per family's request      Remainder of chronic health conditions stableand unchanged  Plan as noted above- will follow up for routine monthly rounds. They will call sooner with any concerns prior.

## 2020-05-29 NOTE — TELEPHONE ENCOUNTER
[facility] certification and plan of care done 5/29/2020 on patient for date services 05/16/20-7/14/20. Verified medications. Physician time spent is 15 minutes for activities to coordinate services, documenting, medical decision making, and review of reports, treatment plans, and test results.

## 2020-06-11 NOTE — PROGRESS NOTES
[facility]  Date of Service: 06/11/20  Melina Lazcano  Date of Birth 1935  [facility] - Hospice patient        HPI:  82year-old female placed on hospice last fall due to progressive decline with dementia. Patient with a progressive mentation decline. Staff deny any significant behavioral outburst.  Cooperative with exam.  Vitals have remained stable. She requires the assistance of the staff for all ADLs including feeding. Oral intakes have remained stable.   Staff denies any concerns at present      November 11, 2017  WBC 5.7  Hemoglobin 12.5  Hematocrit 36.8  Platelets 049     Glucose 67  Sodium 140  Potassium 4.5  BUN 14  Creatinine 0.6  Calcium 9.1  Albumin 3.5  Alkaline phosphorus 98  AST 15  ALT     Total cholesterol 225  Triglycerides 109  HDL 73    Hemoglobin A1c 5.5           Immunization History   Administered Date(s) Administered    Influenza Virus Vaccine 10/04/2018, 10/10/2019    Influenza, High Dose (Fluzone 65 yrs and older) 10/10/2016, 10/05/2017    Pneumococcal Conjugate 13-valent ([id number]) 10/19/2015    Pneumococcal Polysaccharide ([id number]) 06/07/2010       Allergies   Allergen Reactions    Aricept [Donepezil Hcl]     Exelon [Rivastigmine Tartrate]     Namenda [Memantine Hcl]        Past Medical History:   Diagnosis Date    Anxiety 7/18/2017    Constipation     [facility] with [facility] ([facility] [address]     Falls 8/14/2019    Insomnia 7/18/2017    Seborrheic dermatitis     Weakness 7/18/2017    Weight loss 7/18/2017    Currently stable (8/24/17)       Past Surgical History:   Procedure Laterality Date    HAND SURGERY Right     HYSTERECTOMY, TOTAL ABDOMINAL  1984       Social History     Socioeconomic History    Marital status:      Spouse name: Not on file    Number of children: Not on file    Years of education: Not on file    Highest education level: Not on file   Occupational History    Not on file Social Needs    Financial resource strain: Not on file    Food insecurity     Worry: Not on file     Inability: Not on file    Transportation needs     Medical: Not on file     Non-medical: Not on file   Tobacco Use    Smoking status: Not on file   Substance and Sexual Activity    Alcohol use: Not on file    Drug use: Not on file    Sexual activity: Not on file   Lifestyle    Physical activity     Days per week: Not on file     Minutes per session: Not on file    Stress: Not on file   Relationships    Social connections     Talks on phone: Not on file     Gets together: Not on file     Attends Jain service: Not on file     Active member of club or organization: Not on file     Attends meetings of clubs or organizations: Not on file     Relationship status: Not on file    Intimate partner violence     Fear of current or ex partner: Not on file     Emotionally abused: Not on file     Physically abused: Not on file     Forced sexual activity: Not on file   Other Topics Concern    Not on file   Social History Narrative    Not on file       Current Outpatient Medications on File Prior to Visit   Medication Sig Dispense Refill    morphine sulfate 20 MG/ML concentrated oral solution Take 5 mg by mouth every hour as needed for Pain (pain or sob).  ibuprofen (ADVIL;MOTRIN) 400 MG tablet Take 400 mg by mouth every 6 hours as needed for Pain      PRAMOXINE HCL EX Apply topically (1% lotion)  Apply to right arm and shoulders BID      magnesium hydroxide (MILK OF MAGNESIA) 400 MG/5ML suspension Take 30 mLs by mouth daily as needed for Constipation      LORazepam (ATIVAN) 0.5 MG tablet Take 0.25 mg by mouth 3 times daily.  LORazepam (ATIVAN) 0.5 MG tablet Take 0.25 mg by mouth every 4 hours as needed for Anxiety.        sertraline (ZOLOFT) 50 MG tablet Take 75 mg by mouth daily       bisacodyl (DULCOLAX) 10 MG suppository Place 10 mg rectally daily as needed for Constipation (Constipation)  Wheat Dextrin (BENEFIBER PO) Take by mouth 2 times daily      ARIPiprazole (ABILIFY) 2 MG tablet Take 2 mg by mouth 2 times daily       Melatonin 10 MG TABS Take 1 tablet by mouth nightly      ketoconazole (NIZORAL) 2 % shampoo Apply topically Twice a Week Apply topically daily as needed.  acetaminophen (TYLENOL) 325 MG tablet Take 650 mg by mouth 3 times daily And every 4 hours PRN      carbamide peroxide (DEBROX) 6.5 % otic solution as needed       No current facility-administered medications on file prior to visit. Review of Systems   Unable to perform ROS: Dementia        Physical Exam  Vitals signs and nursing note reviewed. Constitutional:       General: She is not in acute distress. Appearance: Normal appearance. She is well-developed. She is not diaphoretic. HENT:      Head: Normocephalic and atraumatic. Right Ear: External ear normal.      Left Ear: External ear normal.   Eyes:      General:         Right eye: No discharge. Left eye: No discharge. Neck:      Trachea: No tracheal deviation. Cardiovascular:      Rate and Rhythm: Normal rate and regular rhythm. Pulses: Normal pulses. Heart sounds: Normal heart sounds. No murmur. No friction rub. No gallop. Pulmonary:      Effort: Pulmonary effort is normal. No respiratory distress. Breath sounds: Normal breath sounds. No stridor. No wheezing, rhonchi or rales. Chest:      Chest wall: No tenderness. Abdominal:      General: Bowel sounds are normal. There is no distension. Palpations: Abdomen is soft. Tenderness: There is no abdominal tenderness. Musculoskeletal:         General: No swelling. Skin:     General: Skin is warm and dry. Capillary Refill: Capillary refill takes less than 2 seconds. Coloration: Skin is not pale. Findings: No rash. Neurological:      Mental Status: She is alert. Mental status is at baseline. She is disoriented. Cranial Nerves:  No

## 2020-06-19 NOTE — ED PROVIDER NOTES
eMERGENCY dEPARTMENT eNCOUnter      Pt Name: Nikolas Blanc  MRN: [id number]  [place] 1935  Date of evaluation: 6/18/2020      CHIEF COMPLAINT       Chief Complaint   Patient presents with    Fall     Got self up out of bed, alarm sounded, patient found on the floor.  Head Laceration     3.5cm laceration above left eye. Edges easily approximated. No active bleeding. 0.5cm skin tear left elbow. No active bleeding. Hematoma to left eye. Dried blood at lower lip. Nothing visible on lips/mouth otherwise. Pt moaning and responding \"yes\" to her name appropriately. HISTORY OF PRESENT ILLNESS    Nikolas Blanc is a 82 y.o. female who presents with fall. Patient is an hospice patient. She apparently got out of bed, fell, hit her head. They do not want anything done other than the laceration repaired. Patient does moan but she is not able to give any        REVIEW OF SYSTEMS       Review of systems are unable to be obtained secondary patient's current medical condition     PAST MEDICAL HISTORY    has a past medical history of Anxiety, Constipation, Dementia with behavioral disturbance ([address] Falls, Insomnia, Seborrheic dermatitis, Weakness, and Weight loss. SURGICAL HISTORY      has a past surgical history that includes Hysterectomy, total abdominal (1984) and Hand surgery (Right). CURRENT MEDICATIONS       Discharge Medication List as of 6/18/2020  8:47 PM      CONTINUE these medications which have NOT CHANGED    Details   morphine sulfate 20 MG/ML concentrated oral solution Take 5 mg by mouth every hour as needed for Pain (pain or sob). Historical Med      ibuprofen (ADVIL;MOTRIN) 400 MG tablet Take 400 mg by mouth every 6 hours as needed for PainHistorical Med      PRAMOXINE HCL EX Apply topically (1% lotion)  Apply to right arm and shoulders BIDHistorical Med      magnesium hydroxide (MILK OF MAGNESIA) 400 MG/5ML suspension Take 30 mLs by mouth daily as needed for ConstipationHistorical Med !! LORazepam (ATIVAN) 0.5 MG tablet Take 0.25 mg by mouth 3 times daily. Historical Med      !! LORazepam (ATIVAN) 0.5 MG tablet Take 0.25 mg by mouth every 4 hours as needed for Anxiety. Historical Med      sertraline (ZOLOFT) 50 MG tablet Take 75 mg by mouth daily Historical Med      bisacodyl (DULCOLAX) 10 MG suppository Place 10 mg rectally daily as needed for Constipation (Constipation) Historical Med      Wheat Dextrin (BENEFIBER PO) Take by mouth 2 times dailyHistorical Med      ARIPiprazole (ABILIFY) 2 MG tablet Take 2 mg by mouth 2 times daily Historical Med      Melatonin 10 MG TABS Take 1 tablet by mouth nightlyHistorical Med      ketoconazole (NIZORAL) 2 % shampoo Apply topically Twice a Week Apply topically daily as needed., Topical, TWICE WEEKLY, Until Discontinued, Historical Med      acetaminophen (TYLENOL) 325 MG tablet Take 650 mg by mouth 3 times daily And every 4 hours PRNHistorical Med      carbamide peroxide (DEBROX) 6.5 % otic solution as neededHistorical Med       !! - Potential duplicate medications found. Please discuss with provider. ALLERGIES     is allergic to aricept [donepezil hcl]; exelon [rivastigmine tartrate]; and namenda [memantine hcl]. FAMILY HISTORY     has no family status information on file. Family history is unknown by patient. SOCIAL HISTORY          PHYSICAL EXAM     INITIAL VITALS:  temporal temperature is 98.7 °F (37.1 °C). Her blood pressure is 124/64 and her pulse is 83. Her respiration is 16 and oxygen saturation is 96%. Gen.: Patient is elderly female with approximately 3 cm laceration above her left eye and to the left eyebrow deep into subcu tissue. Marked amount swelling to the zygomatic arch below the eye. The eyes closed shut because swelling. She has some minor blood around her mouth.   Respiratory: Patient is nonlabored respirations    DIFFERENTIAL DIAGNOSIS/ MDM:     Fall, laceration, head injury, bleed, fracture    DIAGNOSTIC

## 2020-06-22 NOTE — ED NOTES
Report to nurse Leyda Sam at [address] [place] [address] of [address], RN  06/22/20 0343

## 2020-06-22 NOTE — ED PROVIDER NOTES
daily as needed for Constipation (Constipation)     CARBAMIDE PEROXIDE (DEBROX) 6.5 % OTIC SOLUTION    as needed    IBUPROFEN (ADVIL;MOTRIN) 400 MG TABLET    Take 400 mg by mouth every 6 hours as needed for Pain    KETOCONAZOLE (NIZORAL) 2 % SHAMPOO    Apply topically Twice a Week Apply topically daily as needed. LORAZEPAM (ATIVAN) 0.5 MG TABLET    Take 0.5 mg by mouth every 6 hours as needed. LORAZEPAM (ATIVAN) 0.5 MG TABLET    Take 0.25 mg by mouth every 4 hours as needed for Anxiety. MAGNESIUM HYDROXIDE (MILK OF MAGNESIA) 400 MG/5ML SUSPENSION    Take 30 mLs by mouth daily as needed for Constipation    MELATONIN 10 MG TABS    Take 1 tablet by mouth nightly    MORPHINE SULFATE 20 MG/ML CONCENTRATED ORAL SOLUTION    Take 5 mg by mouth every hour as needed for Pain (pain or sob). OXYCODONE HCL (OXYCODONE 20 MG/ML ORAL SOLN CMPD)    Take 0.5 mLs by mouth every hour as needed. PRAMOXINE HCL EX    Apply topically (1% lotion)  Apply to right arm and shoulders BID    SERTRALINE (ZOLOFT) 50 MG TABLET    Take 75 mg by mouth daily     WHEAT DEXTRIN (BENEFIBER PO)    Take by mouth 2 times daily       ALLERGIES     is allergic to aricept [donepezil hcl]; exelon [rivastigmine tartrate]; and namenda [memantine hcl]. FAMILY HISTORY     has no family status information on file. Family history is unknown by patient. SOCIAL HISTORY      reports that she does not drink alcohol. PHYSICAL EXAM     INITIAL VITALS:  tympanic temperature is 98.8 °F (37.1 °C). Her blood pressure is 135/77 and her pulse is 96. Her respiration is 24 and oxygen saturation is 93%. Constitutional: The patient is alert and well-developed, vital signs as noted. Psychiatric: Oriented to time, place and person, affect is appropriate for age. Head: The left forehead demonstrates a skin tear laceration and a previously sutured laceration. No bleeding at this time and no foreign bodies. No depressed skull fracture.   ENT is

## 2020-07-16 ENCOUNTER — TELEPHONE (OUTPATIENT)
Dept: FAMILY MEDICINE CLINIC | Age: 85
End: 2020-07-16

## 2020-07-16 PROCEDURE — G0180 MD CERTIFICATION HHA PATIENT: HCPCS | Performed by: FAMILY MEDICINE

## 2020-07-16 NOTE — TELEPHONE ENCOUNTER
Home health plan of care reviewed and certification completed on 07/16/20 on patient for service dates 06/23/20-08/21/20. Medications reviewed and verified. Physician time spent on activities to coordinate services, documenting medical decision making, reviewing of reports, treatment plans and test results is 20 minutes.